# Patient Record
Sex: MALE | Race: WHITE | Employment: FULL TIME | URBAN - METROPOLITAN AREA
[De-identification: names, ages, dates, MRNs, and addresses within clinical notes are randomized per-mention and may not be internally consistent; named-entity substitution may affect disease eponyms.]

---

## 2017-04-26 ENCOUNTER — INITIAL CONSULT (OUTPATIENT)
Dept: SURGERY | Age: 37
End: 2017-04-26

## 2017-04-26 VITALS
DIASTOLIC BLOOD PRESSURE: 80 MMHG | HEIGHT: 70 IN | BODY MASS INDEX: 24.2 KG/M2 | SYSTOLIC BLOOD PRESSURE: 116 MMHG | WEIGHT: 169 LBS | HEART RATE: 76 BPM

## 2017-04-26 DIAGNOSIS — K42.9 UMBILICAL HERNIA WITHOUT OBSTRUCTION AND WITHOUT GANGRENE: ICD-10-CM

## 2017-04-26 DIAGNOSIS — K40.20 BILATERAL INGUINAL HERNIA WITHOUT OBSTRUCTION OR GANGRENE, RECURRENCE NOT SPECIFIED: Primary | ICD-10-CM

## 2017-04-26 PROCEDURE — 99203 OFFICE O/P NEW LOW 30 MIN: CPT | Performed by: SURGERY

## 2017-04-26 ASSESSMENT — ENCOUNTER SYMPTOMS
NAUSEA: 1
VOMITING: 0
DIARRHEA: 0
CONSTIPATION: 0
RESPIRATORY NEGATIVE: 1
ABDOMINAL PAIN: 1

## 2017-04-28 ENCOUNTER — TELEPHONE (OUTPATIENT)
Dept: SURGERY | Age: 37
End: 2017-04-28

## 2017-05-05 ENCOUNTER — PAT TELEPHONE (OUTPATIENT)
Dept: PREADMISSION TESTING | Age: 37
End: 2017-05-05

## 2017-05-05 VITALS — HEIGHT: 70 IN | WEIGHT: 169 LBS | BODY MASS INDEX: 24.2 KG/M2

## 2017-05-05 ASSESSMENT — PAIN DESCRIPTION - DESCRIPTORS: DESCRIPTORS: ACHING

## 2017-05-05 ASSESSMENT — PAIN DESCRIPTION - ORIENTATION: ORIENTATION: LEFT

## 2017-05-05 ASSESSMENT — PAIN - FUNCTIONAL ASSESSMENT: PAIN_FUNCTIONAL_ASSESSMENT: 0-10

## 2017-05-05 ASSESSMENT — PAIN SCALES - GENERAL: PAINLEVEL_OUTOF10: 6

## 2017-05-05 ASSESSMENT — PAIN DESCRIPTION - PAIN TYPE: TYPE: ACUTE PAIN

## 2017-05-05 ASSESSMENT — PAIN DESCRIPTION - LOCATION: LOCATION: GROIN

## 2017-05-08 ENCOUNTER — HOSPITAL ENCOUNTER (OUTPATIENT)
Dept: SURGERY | Age: 37
Discharge: HOME | End: 2017-05-08
Attending: SURGERY | Admitting: SURGERY

## 2017-05-08 VITALS
HEART RATE: 63 BPM | TEMPERATURE: 97.2 F | OXYGEN SATURATION: 99 % | SYSTOLIC BLOOD PRESSURE: 130 MMHG | WEIGHT: 167.55 LBS | BODY MASS INDEX: 24.04 KG/M2 | RESPIRATION RATE: 18 BRPM | DIASTOLIC BLOOD PRESSURE: 87 MMHG

## 2017-05-08 PROCEDURE — 49585 REPAIR UMBILICAL HERN,5+Y/O,REDUC: CPT | Performed by: SURGERY

## 2017-05-08 PROCEDURE — 49650 LAP ING HERNIA REPAIR INIT: CPT | Performed by: SURGERY

## 2017-05-08 RX ORDER — SODIUM CHLORIDE 0.9 % (FLUSH) 0.9 %
10 SYRINGE (ML) INJECTION PRN
Status: DISCONTINUED | OUTPATIENT
Start: 2017-05-08 | End: 2017-05-09 | Stop reason: HOSPADM

## 2017-05-08 RX ORDER — LABETALOL HYDROCHLORIDE 5 MG/ML
5 INJECTION, SOLUTION INTRAVENOUS EVERY 10 MIN PRN
Status: DISCONTINUED | OUTPATIENT
Start: 2017-05-08 | End: 2017-05-09 | Stop reason: HOSPADM

## 2017-05-08 RX ORDER — OXYCODONE HYDROCHLORIDE AND ACETAMINOPHEN 5; 325 MG/1; MG/1
1-2 TABLET ORAL EVERY 6 HOURS PRN
Qty: 20 TABLET | Refills: 0 | Status: SHIPPED | OUTPATIENT
Start: 2017-05-08 | End: 2017-06-30 | Stop reason: ALTCHOICE

## 2017-05-08 RX ORDER — SODIUM CHLORIDE 0.9 % (FLUSH) 0.9 %
10 SYRINGE (ML) INJECTION EVERY 12 HOURS SCHEDULED
Status: DISCONTINUED | OUTPATIENT
Start: 2017-05-08 | End: 2017-05-09 | Stop reason: HOSPADM

## 2017-05-08 RX ORDER — PROMETHAZINE HYDROCHLORIDE 25 MG/ML
6.25 INJECTION, SOLUTION INTRAMUSCULAR; INTRAVENOUS
Status: ACTIVE | OUTPATIENT
Start: 2017-05-08 | End: 2017-05-08

## 2017-05-08 RX ORDER — SODIUM CHLORIDE 9 MG/ML
INJECTION, SOLUTION INTRAVENOUS CONTINUOUS
Status: DISCONTINUED | OUTPATIENT
Start: 2017-05-08 | End: 2017-05-09 | Stop reason: HOSPADM

## 2017-05-08 RX ORDER — NAPROXEN 500 MG/1
500 TABLET ORAL 2 TIMES DAILY WITH MEALS
Qty: 20 TABLET | Refills: 0 | Status: SHIPPED | OUTPATIENT
Start: 2017-05-08 | End: 2017-06-30 | Stop reason: ALTCHOICE

## 2017-05-08 RX ORDER — FENTANYL CITRATE 50 UG/ML
25 INJECTION, SOLUTION INTRAMUSCULAR; INTRAVENOUS EVERY 5 MIN PRN
Status: COMPLETED | OUTPATIENT
Start: 2017-05-08 | End: 2017-05-08

## 2017-05-08 RX ORDER — OXYCODONE HYDROCHLORIDE AND ACETAMINOPHEN 5; 325 MG/1; MG/1
2 TABLET ORAL EVERY 4 HOURS PRN
Status: COMPLETED | OUTPATIENT
Start: 2017-05-08 | End: 2017-05-08

## 2017-05-08 RX ADMIN — OXYCODONE HYDROCHLORIDE AND ACETAMINOPHEN 2 TABLET: 5; 325 TABLET ORAL at 10:36

## 2017-05-08 RX ADMIN — FENTANYL CITRATE 25 MCG: 50 INJECTION, SOLUTION INTRAMUSCULAR; INTRAVENOUS at 09:43

## 2017-05-08 RX ADMIN — FENTANYL CITRATE 25 MCG: 50 INJECTION, SOLUTION INTRAMUSCULAR; INTRAVENOUS at 09:33

## 2017-05-08 RX ADMIN — SODIUM CHLORIDE: 9 INJECTION, SOLUTION INTRAVENOUS at 06:41

## 2017-05-08 RX ADMIN — FENTANYL CITRATE 25 MCG: 50 INJECTION, SOLUTION INTRAMUSCULAR; INTRAVENOUS at 09:48

## 2017-05-08 RX ADMIN — FENTANYL CITRATE 25 MCG: 50 INJECTION, SOLUTION INTRAMUSCULAR; INTRAVENOUS at 09:38

## 2017-05-08 ASSESSMENT — PAIN DESCRIPTION - LOCATION
LOCATION: ABDOMEN
LOCATION: ABDOMEN

## 2017-05-08 ASSESSMENT — PAIN SCALES - GENERAL
PAINLEVEL_OUTOF10: 10
PAINLEVEL_OUTOF10: 10
PAINLEVEL_OUTOF10: 8
PAINLEVEL_OUTOF10: 9
PAINLEVEL_OUTOF10: 7
PAINLEVEL_OUTOF10: 10
PAINLEVEL_OUTOF10: 7
PAINLEVEL_OUTOF10: 8

## 2017-05-08 ASSESSMENT — PAIN DESCRIPTION - PROGRESSION
CLINICAL_PROGRESSION: GRADUALLY IMPROVING
CLINICAL_PROGRESSION: GRADUALLY WORSENING

## 2017-05-08 ASSESSMENT — PAIN DESCRIPTION - ONSET
ONSET: ON-GOING
ONSET: ON-GOING

## 2017-05-08 ASSESSMENT — PAIN DESCRIPTION - PAIN TYPE
TYPE: SURGICAL PAIN
TYPE: ACUTE PAIN

## 2017-05-08 ASSESSMENT — PAIN DESCRIPTION - DESCRIPTORS
DESCRIPTORS: ACHING;DISCOMFORT;SORE;SHARP
DESCRIPTORS: ACHING;DISCOMFORT;SHARP;SORE

## 2017-05-08 ASSESSMENT — PAIN DESCRIPTION - FREQUENCY
FREQUENCY: CONTINUOUS
FREQUENCY: CONTINUOUS

## 2017-05-08 ASSESSMENT — PAIN - FUNCTIONAL ASSESSMENT: PAIN_FUNCTIONAL_ASSESSMENT: 0-10

## 2017-05-09 ENCOUNTER — TELEPHONE (OUTPATIENT)
Dept: SURGERY | Age: 37
End: 2017-05-09

## 2017-05-11 ENCOUNTER — TELEPHONE (OUTPATIENT)
Dept: SURGERY | Age: 37
End: 2017-05-11

## 2017-05-17 ENCOUNTER — OFFICE VISIT (OUTPATIENT)
Dept: SURGERY | Age: 37
End: 2017-05-17

## 2017-05-17 VITALS
BODY MASS INDEX: 23.34 KG/M2 | HEART RATE: 77 BPM | DIASTOLIC BLOOD PRESSURE: 85 MMHG | SYSTOLIC BLOOD PRESSURE: 129 MMHG | WEIGHT: 163 LBS | HEIGHT: 70 IN

## 2017-05-17 DIAGNOSIS — K40.20 BILATERAL INGUINAL HERNIA WITHOUT OBSTRUCTION OR GANGRENE, RECURRENCE NOT SPECIFIED: Primary | ICD-10-CM

## 2017-05-17 DIAGNOSIS — K42.9 UMBILICAL HERNIA WITHOUT OBSTRUCTION AND WITHOUT GANGRENE: ICD-10-CM

## 2017-05-17 PROCEDURE — 99024 POSTOP FOLLOW-UP VISIT: CPT | Performed by: SURGERY

## 2017-05-17 RX ORDER — OXYCODONE HYDROCHLORIDE AND ACETAMINOPHEN 5; 325 MG/1; MG/1
1 TABLET ORAL EVERY 6 HOURS PRN
Qty: 15 TABLET | Refills: 0 | Status: SHIPPED | OUTPATIENT
Start: 2017-05-17 | End: 2017-06-30 | Stop reason: ALTCHOICE

## 2017-05-18 ENCOUNTER — TELEPHONE (OUTPATIENT)
Dept: CASE MANAGEMENT | Age: 37
End: 2017-05-18

## 2017-05-31 ENCOUNTER — OFFICE VISIT (OUTPATIENT)
Dept: SURGERY | Age: 37
End: 2017-05-31

## 2017-05-31 VITALS
WEIGHT: 163 LBS | SYSTOLIC BLOOD PRESSURE: 121 MMHG | HEART RATE: 84 BPM | BODY MASS INDEX: 23.34 KG/M2 | HEIGHT: 70 IN | DIASTOLIC BLOOD PRESSURE: 83 MMHG

## 2017-05-31 DIAGNOSIS — K40.20 BILATERAL INGUINAL HERNIA WITHOUT OBSTRUCTION OR GANGRENE, RECURRENCE NOT SPECIFIED: Primary | ICD-10-CM

## 2017-05-31 DIAGNOSIS — K42.9 UMBILICAL HERNIA WITHOUT OBSTRUCTION AND WITHOUT GANGRENE: ICD-10-CM

## 2017-05-31 PROCEDURE — 99024 POSTOP FOLLOW-UP VISIT: CPT | Performed by: SURGERY

## 2017-06-07 ENCOUNTER — OFFICE VISIT (OUTPATIENT)
Dept: SURGERY | Age: 37
End: 2017-06-07

## 2017-06-07 VITALS
BODY MASS INDEX: 24.05 KG/M2 | HEART RATE: 66 BPM | SYSTOLIC BLOOD PRESSURE: 113 MMHG | WEIGHT: 168 LBS | HEIGHT: 70 IN | DIASTOLIC BLOOD PRESSURE: 77 MMHG

## 2017-06-07 DIAGNOSIS — K42.9 UMBILICAL HERNIA WITHOUT OBSTRUCTION AND WITHOUT GANGRENE: ICD-10-CM

## 2017-06-07 DIAGNOSIS — K40.20 BILATERAL INGUINAL HERNIA WITHOUT OBSTRUCTION OR GANGRENE, RECURRENCE NOT SPECIFIED: Primary | ICD-10-CM

## 2017-06-07 PROCEDURE — 99024 POSTOP FOLLOW-UP VISIT: CPT | Performed by: SURGERY

## 2017-06-07 RX ORDER — SULFAMETHOXAZOLE AND TRIMETHOPRIM 800; 160 MG/1; MG/1
1 TABLET ORAL 2 TIMES DAILY
Qty: 10 TABLET | Refills: 0 | Status: SHIPPED | OUTPATIENT
Start: 2017-06-07 | End: 2017-06-12

## 2017-06-14 ENCOUNTER — TELEPHONE (OUTPATIENT)
Dept: SURGERY | Age: 37
End: 2017-06-14

## 2017-07-12 ENCOUNTER — OFFICE VISIT (OUTPATIENT)
Dept: PRIMARY CARE CLINIC | Age: 37
End: 2017-07-12

## 2017-07-12 VITALS
HEIGHT: 70 IN | BODY MASS INDEX: 24.54 KG/M2 | WEIGHT: 171.4 LBS | TEMPERATURE: 97.9 F | HEART RATE: 90 BPM | DIASTOLIC BLOOD PRESSURE: 78 MMHG | RESPIRATION RATE: 16 BRPM | SYSTOLIC BLOOD PRESSURE: 128 MMHG | OXYGEN SATURATION: 98 %

## 2017-07-12 DIAGNOSIS — G43.909 MIGRAINE WITHOUT STATUS MIGRAINOSUS, NOT INTRACTABLE, UNSPECIFIED MIGRAINE TYPE: Primary | ICD-10-CM

## 2017-07-12 PROCEDURE — 99202 OFFICE O/P NEW SF 15 MIN: CPT | Performed by: NURSE PRACTITIONER

## 2017-07-12 RX ORDER — SUMATRIPTAN 20 MG/1
1 SPRAY NASAL DAILY PRN
Qty: 1 INHALER | Refills: 5 | Status: SHIPPED | OUTPATIENT
Start: 2017-07-12 | End: 2017-07-21

## 2017-07-12 RX ORDER — METHYLPREDNISOLONE 4 MG/1
TABLET ORAL
Qty: 1 KIT | Refills: 0 | Status: SHIPPED | OUTPATIENT
Start: 2017-07-12 | End: 2017-07-19 | Stop reason: SDUPTHER

## 2017-07-12 RX ORDER — SUMATRIPTAN 100 MG/1
100 TABLET, FILM COATED ORAL
Qty: 10 TABLET | Refills: 0 | Status: SHIPPED | OUTPATIENT
Start: 2017-07-12 | End: 2017-07-22

## 2017-07-12 ASSESSMENT — ENCOUNTER SYMPTOMS
FACIAL SWEATING: 0
EYE REDNESS: 0
VOMITING: 1
SCALP TENDERNESS: 0
RHINORRHEA: 1
BACK PAIN: 0
EYE PAIN: 0
EYE WATERING: 1
NAUSEA: 1
ABDOMINAL PAIN: 0
SORE THROAT: 0
VISUAL CHANGE: 0
PHOTOPHOBIA: 1
BLURRED VISION: 0

## 2017-07-13 ASSESSMENT — ENCOUNTER SYMPTOMS
CONSTIPATION: 0
COUGH: 0
DIARRHEA: 0

## 2017-07-14 ENCOUNTER — TELEPHONE (OUTPATIENT)
Dept: PRIMARY CARE CLINIC | Age: 37
End: 2017-07-14

## 2017-07-14 DIAGNOSIS — G43.909 MIGRAINE WITHOUT STATUS MIGRAINOSUS, NOT INTRACTABLE, UNSPECIFIED MIGRAINE TYPE: Primary | ICD-10-CM

## 2017-07-18 ENCOUNTER — TELEPHONE (OUTPATIENT)
Dept: PRIMARY CARE CLINIC | Age: 37
End: 2017-07-18

## 2017-07-18 RX ORDER — RIZATRIPTAN BENZOATE 10 MG/1
10 TABLET ORAL
Qty: 30 TABLET | Refills: 0 | Status: SHIPPED | OUTPATIENT
Start: 2017-07-18 | End: 2017-08-01 | Stop reason: SDUPTHER

## 2017-07-18 RX ORDER — ELETRIPTAN HYDROBROMIDE 40 MG/1
40 TABLET, FILM COATED ORAL
Qty: 6 TABLET | Refills: 3 | Status: SHIPPED | OUTPATIENT
Start: 2017-07-18 | End: 2017-07-18 | Stop reason: ALTCHOICE

## 2017-07-19 ENCOUNTER — TELEPHONE (OUTPATIENT)
Dept: PRIMARY CARE CLINIC | Age: 37
End: 2017-07-19

## 2017-07-19 DIAGNOSIS — G43.909 MIGRAINE WITHOUT STATUS MIGRAINOSUS, NOT INTRACTABLE, UNSPECIFIED MIGRAINE TYPE: ICD-10-CM

## 2017-07-19 RX ORDER — METHYLPREDNISOLONE 4 MG/1
TABLET ORAL
Qty: 1 KIT | Refills: 0 | Status: SHIPPED | OUTPATIENT
Start: 2017-07-19 | End: 2017-07-25

## 2017-08-01 ENCOUNTER — TELEPHONE (OUTPATIENT)
Dept: PRIMARY CARE CLINIC | Age: 37
End: 2017-08-01

## 2017-08-01 RX ORDER — PROPRANOLOL HYDROCHLORIDE 10 MG/1
10 TABLET ORAL 3 TIMES DAILY
Qty: 90 TABLET | Refills: 0 | Status: SHIPPED | OUTPATIENT
Start: 2017-08-01 | End: 2017-08-24 | Stop reason: CLARIF

## 2017-08-01 RX ORDER — PREDNISONE 20 MG/1
20 TABLET ORAL DAILY
Qty: 7 TABLET | Refills: 0 | Status: SHIPPED | OUTPATIENT
Start: 2017-08-01 | End: 2017-08-08

## 2017-08-01 RX ORDER — RIZATRIPTAN BENZOATE 10 MG/1
10 TABLET ORAL
Qty: 10 TABLET | Refills: 0 | Status: SHIPPED | OUTPATIENT
Start: 2017-08-01 | End: 2019-01-14 | Stop reason: ALTCHOICE

## 2017-08-01 RX ORDER — ELETRIPTAN HYDROBROMIDE 40 MG/1
40 TABLET, FILM COATED ORAL
Qty: 6 TABLET | Refills: 3 | Status: SHIPPED | OUTPATIENT
Start: 2017-08-01 | End: 2019-01-14 | Stop reason: ALTCHOICE

## 2017-08-24 ENCOUNTER — TELEPHONE (OUTPATIENT)
Dept: NEUROLOGY | Age: 37
End: 2017-08-24

## 2017-08-24 ENCOUNTER — OFFICE VISIT (OUTPATIENT)
Dept: NEUROLOGY | Age: 37
End: 2017-08-24

## 2017-08-24 VITALS
DIASTOLIC BLOOD PRESSURE: 83 MMHG | HEART RATE: 68 BPM | WEIGHT: 173 LBS | BODY MASS INDEX: 24.77 KG/M2 | SYSTOLIC BLOOD PRESSURE: 120 MMHG | HEIGHT: 70 IN

## 2017-08-24 DIAGNOSIS — G43.019 INTRACTABLE MIGRAINE WITHOUT AURA AND WITHOUT STATUS MIGRAINOSUS: Primary | ICD-10-CM

## 2017-08-24 PROCEDURE — 99244 OFF/OP CNSLTJ NEW/EST MOD 40: CPT | Performed by: PSYCHIATRY & NEUROLOGY

## 2017-08-24 RX ORDER — DIVALPROEX SODIUM 500 MG/1
TABLET, EXTENDED RELEASE ORAL
Qty: 30 TABLET | Refills: 1 | Status: SHIPPED | OUTPATIENT
Start: 2017-08-24 | End: 2019-01-14 | Stop reason: ALTCHOICE

## 2017-08-29 ENCOUNTER — TELEPHONE (OUTPATIENT)
Dept: NEUROLOGY | Age: 37
End: 2017-08-29

## 2017-12-20 ENCOUNTER — OFFICE VISIT (OUTPATIENT)
Dept: SURGERY | Age: 37
End: 2017-12-20

## 2017-12-20 VITALS
HEART RATE: 70 BPM | SYSTOLIC BLOOD PRESSURE: 137 MMHG | BODY MASS INDEX: 24.77 KG/M2 | DIASTOLIC BLOOD PRESSURE: 89 MMHG | WEIGHT: 173 LBS | HEIGHT: 70 IN

## 2017-12-20 DIAGNOSIS — R10.9 LEFT FLANK PAIN: Primary | ICD-10-CM

## 2017-12-20 PROBLEM — R10.12 LUQ PAIN: Status: ACTIVE | Noted: 2017-12-20

## 2017-12-20 PROCEDURE — 99213 OFFICE O/P EST LOW 20 MIN: CPT | Performed by: SURGERY

## 2017-12-20 ASSESSMENT — ENCOUNTER SYMPTOMS
GASTROINTESTINAL NEGATIVE: 1
RESPIRATORY NEGATIVE: 1

## 2017-12-20 NOTE — PROGRESS NOTES
Subjective:      Patient ID: Beck Wesley is a 40 y.o. male. HPI  CC: pain  Known from previous lap BIH and umbilical hernia repair. Reports one week hx left flank pain. Normal BMs. Denies nausea, emesis, fever, chills. Works out. Hurts worse with movements, twisting. Minimal relief with advil. Review of Systems   Constitutional: Negative. Respiratory: Negative. Cardiovascular: Negative. Gastrointestinal: Negative. Musculoskeletal: Negative. Skin: Negative. All other systems reviewed and are negative. Objective:   Physical Exam   Constitutional: He is oriented to person, place, and time. He appears well-developed and well-nourished. No distress. HENT:   Head: Normocephalic and atraumatic. Right Ear: External ear normal.   Left Ear: External ear normal.   Nose: Nose normal.   Mouth/Throat: Oropharynx is clear and moist.   Eyes: Conjunctivae are normal. No scleral icterus. Neck: Normal range of motion. Neck supple. Pulmonary/Chest: Effort normal. No respiratory distress. Abdominal: Soft. He exhibits no distension. There is tenderness (left mid flank). No recurrent bilateral inguinal or umbilical hernia appreciated     Musculoskeletal: Normal range of motion. He exhibits no edema. Neurological: He is alert and oriented to person, place, and time. Skin: Skin is warm and dry. He is not diaphoretic. No erythema. Psychiatric: He has a normal mood and affect. His behavior is normal. Judgment and thought content normal.   Vitals reviewed. Assessment:      1. Left flank pain             Plan:      No evidence of recurrent hernia  Suspect MSK strain  Encouraged BID stretching and scheduled NSAIDs  Call if no improvement 3 weeks.   Consider CT but felt to be low yield at this time

## 2018-11-10 ENCOUNTER — HOSPITAL ENCOUNTER (EMERGENCY)
Age: 38
Discharge: HOME | End: 2018-11-10
Attending: EMERGENCY MEDICINE
Payer: COMMERCIAL

## 2018-11-10 ENCOUNTER — APPOINTMENT (OUTPATIENT)
Dept: CT IMAGING | Age: 38
End: 2018-11-10
Payer: COMMERCIAL

## 2018-11-10 VITALS
OXYGEN SATURATION: 96 % | BODY MASS INDEX: 25.11 KG/M2 | WEIGHT: 175 LBS | HEART RATE: 76 BPM | RESPIRATION RATE: 16 BRPM | SYSTOLIC BLOOD PRESSURE: 125 MMHG | TEMPERATURE: 98.2 F | DIASTOLIC BLOOD PRESSURE: 93 MMHG

## 2018-11-10 DIAGNOSIS — L03.211 FACIAL CELLULITIS: Primary | ICD-10-CM

## 2018-11-10 LAB
ANION GAP SERPL CALCULATED.3IONS-SCNC: 10 MMOL/L (ref 3–16)
BASOPHILS ABSOLUTE: 0.1 K/UL (ref 0–0.2)
BASOPHILS RELATIVE PERCENT: 0.7 %
BUN BLDV-MCNC: 28 MG/DL (ref 7–20)
CALCIUM SERPL-MCNC: 9.6 MG/DL (ref 8.3–10.6)
CHLORIDE BLD-SCNC: 103 MMOL/L (ref 99–110)
CO2: 24 MMOL/L (ref 21–32)
CREAT SERPL-MCNC: 0.7 MG/DL (ref 0.9–1.3)
EOSINOPHILS ABSOLUTE: 0.1 K/UL (ref 0–0.6)
EOSINOPHILS RELATIVE PERCENT: 1.1 %
GFR AFRICAN AMERICAN: >60
GFR NON-AFRICAN AMERICAN: >60
GLUCOSE BLD-MCNC: 92 MG/DL (ref 70–99)
HCT VFR BLD CALC: 43.9 % (ref 40.5–52.5)
HEMOGLOBIN: 14.9 G/DL (ref 13.5–17.5)
LYMPHOCYTES ABSOLUTE: 1.7 K/UL (ref 1–5.1)
LYMPHOCYTES RELATIVE PERCENT: 21.3 %
MCH RBC QN AUTO: 31 PG (ref 26–34)
MCHC RBC AUTO-ENTMCNC: 33.9 G/DL (ref 31–36)
MCV RBC AUTO: 91.6 FL (ref 80–100)
MONOCYTES ABSOLUTE: 0.7 K/UL (ref 0–1.3)
MONOCYTES RELATIVE PERCENT: 8.9 %
NEUTROPHILS ABSOLUTE: 5.4 K/UL (ref 1.7–7.7)
NEUTROPHILS RELATIVE PERCENT: 68 %
PDW BLD-RTO: 12.5 % (ref 12.4–15.4)
PLATELET # BLD: 162 K/UL (ref 135–450)
PMV BLD AUTO: 8.3 FL (ref 5–10.5)
POTASSIUM SERPL-SCNC: 3.8 MMOL/L (ref 3.5–5.1)
RBC # BLD: 4.8 M/UL (ref 4.2–5.9)
SODIUM BLD-SCNC: 137 MMOL/L (ref 136–145)
WBC # BLD: 7.9 K/UL (ref 4–11)

## 2018-11-10 PROCEDURE — 99284 EMERGENCY DEPT VISIT MOD MDM: CPT

## 2018-11-10 PROCEDURE — 36415 COLL VENOUS BLD VENIPUNCTURE: CPT

## 2018-11-10 PROCEDURE — 70487 CT MAXILLOFACIAL W/DYE: CPT

## 2018-11-10 PROCEDURE — 6370000000 HC RX 637 (ALT 250 FOR IP): Performed by: EMERGENCY MEDICINE

## 2018-11-10 PROCEDURE — 80048 BASIC METABOLIC PNL TOTAL CA: CPT

## 2018-11-10 PROCEDURE — 85025 COMPLETE CBC W/AUTO DIFF WBC: CPT

## 2018-11-10 PROCEDURE — 6360000004 HC RX CONTRAST MEDICATION: Performed by: EMERGENCY MEDICINE

## 2018-11-10 RX ORDER — ACETAMINOPHEN 325 MG/1
650 TABLET ORAL ONCE
Status: COMPLETED | OUTPATIENT
Start: 2018-11-10 | End: 2018-11-10

## 2018-11-10 RX ORDER — TRAMADOL HYDROCHLORIDE 50 MG/1
50 TABLET ORAL ONCE
Status: COMPLETED | OUTPATIENT
Start: 2018-11-10 | End: 2018-11-10

## 2018-11-10 RX ORDER — TRAMADOL HYDROCHLORIDE 50 MG/1
50 TABLET ORAL EVERY 8 HOURS PRN
Qty: 12 TABLET | Refills: 0 | Status: SHIPPED | OUTPATIENT
Start: 2018-11-10 | End: 2019-04-08 | Stop reason: SDUPTHER

## 2018-11-10 RX ADMIN — TRAMADOL HYDROCHLORIDE 50 MG: 50 TABLET, FILM COATED ORAL at 04:03

## 2018-11-10 RX ADMIN — ACETAMINOPHEN 650 MG: 325 TABLET ORAL at 02:43

## 2018-11-10 RX ADMIN — IOPAMIDOL 75 ML: 755 INJECTION, SOLUTION INTRAVENOUS at 03:09

## 2018-11-10 ASSESSMENT — ENCOUNTER SYMPTOMS
TROUBLE SWALLOWING: 0
NAUSEA: 0
VOMITING: 0

## 2018-11-10 ASSESSMENT — PAIN SCALES - GENERAL
PAINLEVEL_OUTOF10: 10
PAINLEVEL_OUTOF10: 8
PAINLEVEL_OUTOF10: 10
PAINLEVEL_OUTOF10: 8

## 2019-01-14 ENCOUNTER — OFFICE VISIT (OUTPATIENT)
Dept: FAMILY MEDICINE CLINIC | Age: 39
End: 2019-01-14
Payer: COMMERCIAL

## 2019-01-14 VITALS
HEIGHT: 70 IN | TEMPERATURE: 97.9 F | SYSTOLIC BLOOD PRESSURE: 116 MMHG | WEIGHT: 176 LBS | DIASTOLIC BLOOD PRESSURE: 78 MMHG | HEART RATE: 68 BPM | BODY MASS INDEX: 25.2 KG/M2 | OXYGEN SATURATION: 98 %

## 2019-01-14 DIAGNOSIS — Z80.0 FH: COLON CANCER IN FIRST DEGREE RELATIVE <60 YEARS OLD: ICD-10-CM

## 2019-01-14 DIAGNOSIS — M62.521 ATROPHY OF MUSCLE OF RIGHT UPPER ARM: ICD-10-CM

## 2019-01-14 DIAGNOSIS — Z13.220 NEED FOR LIPID SCREENING: ICD-10-CM

## 2019-01-14 DIAGNOSIS — R29.898 RIGHT ARM WEAKNESS: ICD-10-CM

## 2019-01-14 DIAGNOSIS — Z12.11 SCREENING FOR COLON CANCER: ICD-10-CM

## 2019-01-14 DIAGNOSIS — M72.2 PLANTAR FASCIITIS OF RIGHT FOOT: Primary | ICD-10-CM

## 2019-01-14 DIAGNOSIS — Z13.1 SCREENING FOR DIABETES MELLITUS: ICD-10-CM

## 2019-01-14 PROCEDURE — G8427 DOCREV CUR MEDS BY ELIG CLIN: HCPCS | Performed by: NURSE PRACTITIONER

## 2019-01-14 PROCEDURE — 1036F TOBACCO NON-USER: CPT | Performed by: NURSE PRACTITIONER

## 2019-01-14 PROCEDURE — G8419 CALC BMI OUT NRM PARAM NOF/U: HCPCS | Performed by: NURSE PRACTITIONER

## 2019-01-14 PROCEDURE — 99204 OFFICE O/P NEW MOD 45 MIN: CPT | Performed by: NURSE PRACTITIONER

## 2019-01-14 PROCEDURE — G8484 FLU IMMUNIZE NO ADMIN: HCPCS | Performed by: NURSE PRACTITIONER

## 2019-01-14 RX ORDER — ACETAMINOPHEN AND CODEINE PHOSPHATE 300; 30 MG/1; MG/1
1 TABLET ORAL EVERY 6 HOURS PRN
Qty: 28 TABLET | Refills: 0 | Status: SHIPPED | OUTPATIENT
Start: 2019-01-14 | End: 2019-02-01 | Stop reason: SDUPTHER

## 2019-01-14 RX ORDER — PREDNISONE 10 MG/1
TABLET ORAL
Qty: 30 TABLET | Refills: 0 | Status: SHIPPED | OUTPATIENT
Start: 2019-01-14 | End: 2019-04-18 | Stop reason: ALTCHOICE

## 2019-01-14 ASSESSMENT — PATIENT HEALTH QUESTIONNAIRE - PHQ9
2. FEELING DOWN, DEPRESSED OR HOPELESS: 0
1. LITTLE INTEREST OR PLEASURE IN DOING THINGS: 0
SUM OF ALL RESPONSES TO PHQ9 QUESTIONS 1 & 2: 0
SUM OF ALL RESPONSES TO PHQ QUESTIONS 1-9: 0
SUM OF ALL RESPONSES TO PHQ QUESTIONS 1-9: 0

## 2019-01-28 ENCOUNTER — OFFICE VISIT (OUTPATIENT)
Dept: ORTHOPEDIC SURGERY | Age: 39
End: 2019-01-28
Payer: COMMERCIAL

## 2019-01-28 ENCOUNTER — TELEPHONE (OUTPATIENT)
Dept: ORTHOPEDIC SURGERY | Age: 39
End: 2019-01-28

## 2019-01-28 VITALS — WEIGHT: 176 LBS | RESPIRATION RATE: 16 BRPM | BODY MASS INDEX: 25.2 KG/M2 | HEIGHT: 70 IN

## 2019-01-28 DIAGNOSIS — G72.89: Primary | ICD-10-CM

## 2019-01-28 DIAGNOSIS — M48.02 CERVICAL SPINAL STENOSIS: ICD-10-CM

## 2019-01-28 DIAGNOSIS — M25.511 RIGHT SHOULDER PAIN, UNSPECIFIED CHRONICITY: ICD-10-CM

## 2019-01-28 PROCEDURE — G8484 FLU IMMUNIZE NO ADMIN: HCPCS | Performed by: ORTHOPAEDIC SURGERY

## 2019-01-28 PROCEDURE — G8427 DOCREV CUR MEDS BY ELIG CLIN: HCPCS | Performed by: ORTHOPAEDIC SURGERY

## 2019-01-28 PROCEDURE — G8419 CALC BMI OUT NRM PARAM NOF/U: HCPCS | Performed by: ORTHOPAEDIC SURGERY

## 2019-01-28 PROCEDURE — 99243 OFF/OP CNSLTJ NEW/EST LOW 30: CPT | Performed by: ORTHOPAEDIC SURGERY

## 2019-02-01 ENCOUNTER — TELEPHONE (OUTPATIENT)
Dept: FAMILY MEDICINE CLINIC | Age: 39
End: 2019-02-01

## 2019-02-01 DIAGNOSIS — M72.2 PLANTAR FASCIITIS OF RIGHT FOOT: ICD-10-CM

## 2019-02-01 RX ORDER — ACETAMINOPHEN AND CODEINE PHOSPHATE 300; 30 MG/1; MG/1
1 TABLET ORAL EVERY 6 HOURS PRN
Qty: 28 TABLET | Refills: 0 | Status: SHIPPED | OUTPATIENT
Start: 2019-02-01 | End: 2019-02-08

## 2019-02-15 ENCOUNTER — TELEPHONE (OUTPATIENT)
Dept: ORTHOPEDIC SURGERY | Age: 39
End: 2019-02-15

## 2019-02-19 ENCOUNTER — HOSPITAL ENCOUNTER (OUTPATIENT)
Dept: NEUROLOGY | Age: 39
Discharge: HOME | End: 2019-02-19
Payer: COMMERCIAL

## 2019-02-19 DIAGNOSIS — G72.89: ICD-10-CM

## 2019-02-19 PROCEDURE — 95908 NRV CNDJ TST 3-4 STUDIES: CPT

## 2019-02-19 PROCEDURE — 95860 NEEDLE EMG 1 EXTREMITY: CPT

## 2019-02-21 PROBLEM — M54.12 CERVICAL RADICULOPATHY: Status: ACTIVE | Noted: 2019-02-21

## 2019-03-27 ENCOUNTER — OFFICE VISIT (OUTPATIENT)
Dept: ORTHOPEDIC SURGERY | Age: 39
End: 2019-03-27
Payer: COMMERCIAL

## 2019-03-27 VITALS
HEIGHT: 70 IN | BODY MASS INDEX: 25.19 KG/M2 | HEART RATE: 70 BPM | WEIGHT: 175.93 LBS | SYSTOLIC BLOOD PRESSURE: 109 MMHG | DIASTOLIC BLOOD PRESSURE: 69 MMHG

## 2019-03-27 DIAGNOSIS — M62.519 ATROPHY OF DELTOID MUSCLE: ICD-10-CM

## 2019-03-27 DIAGNOSIS — M25.511 RIGHT SHOULDER PAIN, UNSPECIFIED CHRONICITY: Primary | ICD-10-CM

## 2019-03-27 DIAGNOSIS — M54.12 CERVICAL RADICULOPATHY: ICD-10-CM

## 2019-03-27 PROCEDURE — G8484 FLU IMMUNIZE NO ADMIN: HCPCS | Performed by: ORTHOPAEDIC SURGERY

## 2019-03-27 PROCEDURE — G8419 CALC BMI OUT NRM PARAM NOF/U: HCPCS | Performed by: ORTHOPAEDIC SURGERY

## 2019-03-27 PROCEDURE — 99243 OFF/OP CNSLTJ NEW/EST LOW 30: CPT | Performed by: ORTHOPAEDIC SURGERY

## 2019-03-27 PROCEDURE — G8427 DOCREV CUR MEDS BY ELIG CLIN: HCPCS | Performed by: ORTHOPAEDIC SURGERY

## 2019-03-27 ASSESSMENT — ENCOUNTER SYMPTOMS
GASTROINTESTINAL NEGATIVE: 1
ALLERGIC/IMMUNOLOGIC NEGATIVE: 1
RESPIRATORY NEGATIVE: 1
EYES NEGATIVE: 1

## 2019-04-08 ENCOUNTER — HOSPITAL ENCOUNTER (OUTPATIENT)
Dept: PHYSICAL THERAPY | Age: 39
Setting detail: THERAPIES SERIES
Discharge: HOME | End: 2019-04-08
Payer: COMMERCIAL

## 2019-04-08 DIAGNOSIS — M72.2 PLANTAR FASCIITIS: Primary | ICD-10-CM

## 2019-04-08 DIAGNOSIS — L03.211 FACIAL CELLULITIS: ICD-10-CM

## 2019-04-08 RX ORDER — TRAMADOL HYDROCHLORIDE 50 MG/1
50 TABLET ORAL EVERY 8 HOURS PRN
Qty: 12 TABLET | Refills: 0 | Status: SHIPPED | OUTPATIENT
Start: 2019-04-08 | End: 2019-04-12

## 2019-04-17 ENCOUNTER — TELEPHONE (OUTPATIENT)
Dept: FAMILY MEDICINE CLINIC | Age: 39
End: 2019-04-17

## 2019-04-18 ENCOUNTER — OFFICE VISIT (OUTPATIENT)
Dept: FAMILY MEDICINE CLINIC | Age: 39
End: 2019-04-18
Payer: COMMERCIAL

## 2019-04-18 VITALS
HEIGHT: 72 IN | WEIGHT: 172 LBS | SYSTOLIC BLOOD PRESSURE: 104 MMHG | DIASTOLIC BLOOD PRESSURE: 68 MMHG | BODY MASS INDEX: 23.3 KG/M2

## 2019-04-18 DIAGNOSIS — M72.2 PLANTAR FASCIITIS, RIGHT: Primary | ICD-10-CM

## 2019-04-18 PROCEDURE — 20550 NJX 1 TENDON SHEATH/LIGAMENT: CPT | Performed by: FAMILY MEDICINE

## 2019-04-18 RX ORDER — TRAMADOL HYDROCHLORIDE 50 MG/1
50 TABLET ORAL EVERY 4 HOURS PRN
Qty: 30 TABLET | Refills: 0 | Status: SHIPPED | OUTPATIENT
Start: 2019-04-18 | End: 2019-06-26 | Stop reason: SDUPTHER

## 2019-04-18 RX ORDER — METHYLPREDNISOLONE ACETATE 40 MG/ML
40 INJECTION, SUSPENSION INTRA-ARTICULAR; INTRALESIONAL; INTRAMUSCULAR; SOFT TISSUE ONCE
Status: COMPLETED | OUTPATIENT
Start: 2019-04-18 | End: 2019-04-18

## 2019-04-18 RX ADMIN — METHYLPREDNISOLONE ACETATE 40 MG: 40 INJECTION, SUSPENSION INTRA-ARTICULAR; INTRALESIONAL; INTRAMUSCULAR; SOFT TISSUE at 18:29

## 2019-05-09 ENCOUNTER — TELEPHONE (OUTPATIENT)
Dept: ORTHOPEDIC SURGERY | Age: 39
End: 2019-05-09

## 2019-06-26 DIAGNOSIS — M72.2 PLANTAR FASCIITIS, RIGHT: ICD-10-CM

## 2019-06-26 RX ORDER — TRAMADOL HYDROCHLORIDE 50 MG/1
50 TABLET ORAL EVERY 4 HOURS PRN
Qty: 30 TABLET | Refills: 0 | Status: SHIPPED | OUTPATIENT
Start: 2019-06-26 | End: 2019-07-12 | Stop reason: SDUPTHER

## 2019-06-26 NOTE — TELEPHONE ENCOUNTER
Patient is having trouble with the plantar fascitis again. Has an appointment with Dr Katerin Gonzalez on 7/12/19 for cortisone injection again. Asking if we can call in the Tramadol since the appointment is out a ways.   Uses [facility]

## 2019-07-12 ENCOUNTER — OFFICE VISIT (OUTPATIENT)
Dept: FAMILY MEDICINE CLINIC | Age: 39
End: 2019-07-12
Payer: COMMERCIAL

## 2019-07-12 VITALS
BODY MASS INDEX: 23.3 KG/M2 | SYSTOLIC BLOOD PRESSURE: 116 MMHG | WEIGHT: 172 LBS | DIASTOLIC BLOOD PRESSURE: 74 MMHG | HEIGHT: 72 IN

## 2019-07-12 DIAGNOSIS — M72.2 PLANTAR FASCIITIS, RIGHT: ICD-10-CM

## 2019-07-12 DIAGNOSIS — M72.2 PLANTAR FASCIITIS OF RIGHT FOOT: Primary | ICD-10-CM

## 2019-07-12 PROCEDURE — 20550 NJX 1 TENDON SHEATH/LIGAMENT: CPT | Performed by: FAMILY MEDICINE

## 2019-07-12 RX ORDER — METHYLPREDNISOLONE ACETATE 40 MG/ML
40 INJECTION, SUSPENSION INTRA-ARTICULAR; INTRALESIONAL; INTRAMUSCULAR; SOFT TISSUE ONCE
Status: COMPLETED | OUTPATIENT
Start: 2019-07-12 | End: 2019-07-12

## 2019-07-12 RX ORDER — TRAMADOL HYDROCHLORIDE 50 MG/1
50 TABLET ORAL EVERY 4 HOURS PRN
Qty: 30 TABLET | Refills: 0 | Status: SHIPPED | OUTPATIENT
Start: 2019-07-12 | End: 2019-10-25 | Stop reason: SDUPTHER

## 2019-07-12 RX ADMIN — METHYLPREDNISOLONE ACETATE 40 MG: 40 INJECTION, SUSPENSION INTRA-ARTICULAR; INTRALESIONAL; INTRAMUSCULAR; SOFT TISSUE at 16:12

## 2019-07-16 ENCOUNTER — TELEPHONE (OUTPATIENT)
Dept: FAMILY MEDICINE CLINIC | Age: 39
End: 2019-07-16

## 2019-07-16 RX ORDER — DICLOFENAC SODIUM 75 MG/1
75 TABLET, DELAYED RELEASE ORAL 2 TIMES DAILY
Qty: 20 TABLET | Refills: 3 | Status: SHIPPED | OUTPATIENT
Start: 2019-07-16 | End: 2021-06-25

## 2019-07-16 NOTE — TELEPHONE ENCOUNTER
Pt was in on Friday and received a cortisone shot I his foot. Pt is still having pain in the arch of his foot towards the heel. What should he do? The shot did help the outside of the foot. Can he get a refill on the pain meds-Tramadol.   [facility] on [address]

## 2019-07-22 ENCOUNTER — TELEPHONE (OUTPATIENT)
Dept: FAMILY MEDICINE CLINIC | Age: 39
End: 2019-07-22

## 2019-07-22 DIAGNOSIS — M72.2 PLANTAR FASCIITIS OF RIGHT FOOT: Primary | ICD-10-CM

## 2019-07-23 RX ORDER — IBUPROFEN 800 MG/1
800 TABLET ORAL 2 TIMES DAILY PRN
Qty: 60 TABLET | Refills: 0 | Status: SHIPPED | OUTPATIENT
Start: 2019-07-23 | End: 2019-07-23 | Stop reason: CLARIF

## 2019-10-24 DIAGNOSIS — M72.2 PLANTAR FASCIITIS, RIGHT: ICD-10-CM

## 2019-10-25 RX ORDER — TRAMADOL HYDROCHLORIDE 50 MG/1
50 TABLET ORAL EVERY 6 HOURS PRN
Qty: 20 TABLET | Refills: 0 | Status: SHIPPED | OUTPATIENT
Start: 2019-10-25 | End: 2020-03-11 | Stop reason: SDUPTHER

## 2020-03-11 RX ORDER — TRAMADOL HYDROCHLORIDE 50 MG/1
50 TABLET ORAL EVERY 6 HOURS PRN
Qty: 20 TABLET | Refills: 0 | Status: SHIPPED | OUTPATIENT
Start: 2020-03-11 | End: 2020-03-16

## 2020-07-01 ENCOUNTER — HOSPITAL ENCOUNTER (EMERGENCY)
Age: 40
Discharge: HOME | End: 2020-07-01

## 2020-07-01 VITALS
WEIGHT: 175.04 LBS | RESPIRATION RATE: 16 BRPM | BODY MASS INDEX: 25.06 KG/M2 | DIASTOLIC BLOOD PRESSURE: 77 MMHG | TEMPERATURE: 98.2 F | HEIGHT: 70 IN | OXYGEN SATURATION: 98 % | SYSTOLIC BLOOD PRESSURE: 118 MMHG | HEART RATE: 70 BPM

## 2020-07-01 PROCEDURE — 6370000000 HC RX 637 (ALT 250 FOR IP): Performed by: PHYSICIAN ASSISTANT

## 2020-07-01 PROCEDURE — 99282 EMERGENCY DEPT VISIT SF MDM: CPT

## 2020-07-01 RX ORDER — HYDROCODONE BITARTRATE AND ACETAMINOPHEN 5; 325 MG/1; MG/1
1 TABLET ORAL EVERY 8 HOURS PRN
Qty: 6 TABLET | Refills: 0 | Status: SHIPPED | OUTPATIENT
Start: 2020-07-01 | End: 2020-07-04

## 2020-07-01 RX ORDER — PENICILLIN V POTASSIUM 250 MG/1
500 TABLET ORAL ONCE
Status: COMPLETED | OUTPATIENT
Start: 2020-07-01 | End: 2020-07-01

## 2020-07-01 RX ORDER — PENICILLIN V POTASSIUM 500 MG/1
500 TABLET ORAL 4 TIMES DAILY
Qty: 28 TABLET | Refills: 0 | Status: SHIPPED | OUTPATIENT
Start: 2020-07-01 | End: 2020-07-08

## 2020-07-01 RX ADMIN — PENICILLIN V POTASIUM 500 MG: 250 TABLET ORAL at 09:42

## 2020-07-01 ASSESSMENT — ENCOUNTER SYMPTOMS
VOMITING: 0
DIARRHEA: 0
EYE PAIN: 0
SHORTNESS OF BREATH: 0
COUGH: 0
NAUSEA: 0
BACK PAIN: 0
ABDOMINAL PAIN: 0

## 2020-07-01 ASSESSMENT — PAIN DESCRIPTION - FREQUENCY: FREQUENCY: CONTINUOUS

## 2020-07-01 ASSESSMENT — PAIN DESCRIPTION - LOCATION: LOCATION: TEETH

## 2020-07-01 ASSESSMENT — PAIN DESCRIPTION - ORIENTATION: ORIENTATION: RIGHT;LOWER

## 2020-07-01 ASSESSMENT — PAIN DESCRIPTION - DESCRIPTORS: DESCRIPTORS: THROBBING

## 2020-07-01 ASSESSMENT — PAIN - FUNCTIONAL ASSESSMENT: PAIN_FUNCTIONAL_ASSESSMENT: 0-10

## 2020-07-01 ASSESSMENT — PAIN SCALES - GENERAL: PAINLEVEL_OUTOF10: 8

## 2020-07-01 ASSESSMENT — PAIN DESCRIPTION - ONSET: ONSET: GRADUAL

## 2020-07-01 ASSESSMENT — PAIN DESCRIPTION - PROGRESSION: CLINICAL_PROGRESSION: NOT CHANGED

## 2020-07-01 ASSESSMENT — PAIN DESCRIPTION - PAIN TYPE: TYPE: ACUTE PAIN

## 2020-07-01 NOTE — ED PROVIDER NOTES
[address]        Pt Name: Brandon Quevedo  MRN: [id number]  [place] 1980  Date of evaluation: 7/1/2020  Provider: LEXIS Turner  PCP: Antonio Alvarez MD    Evaluation by ISMAEL. My supervising physician was available for consultation. CHIEF COMPLAINT       Chief Complaint   Patient presents with    Dental Pain     right lower since 6/30       HISTORY OF PRESENT ILLNESS   (Location, Timing/Onset, Context/Setting, Quality, Duration, Modifying Factors, Severity, Associated Signs and Symptoms)  Note limiting factors. Brandon Quevedo is a 44 y.o. male who presents to enter the emergency department via personal vehicle for evaluation of severe right lower molar pain. Patient states that many years ago he had a root canal at this tooth and had a crown placed. He developed pain over the past 2 days. It is severe and rated as an 8 out of 10, described as throbbing and constant. He is tried taking extra strength ibuprofen and Tylenol, Orajel, without any relief of his pain. It is worse with eating. He called his dentist but they are full and cannot get him in for an appt until July 24. He states he is not able to sleep due to the severe pain. He denies fever, chills, headache. No history of IV drug use. He does have PMH of  Previous stomach ulcer. No Abdominal pain or melana. No recent travel, exposure to sick contacts, or recent antibiotics. No other acute concerns, associated symptoms or modifying factors. Nursing Notes were all reviewed and agreed with or any disagreements were addressed in the HPI. REVIEW OF SYSTEMS    (2-9 systems for level 4, 10 or more for level 5)     Review of Systems   Constitutional: Negative for chills, fatigue and fever. HENT: Positive for dental problem. Eyes: Negative for pain. Respiratory: Negative for cough and shortness of breath.     Cardiovascular: Negative for chest pain.   Gastrointestinal: Negative for abdominal pain, diarrhea, nausea and vomiting. Genitourinary: Negative for dysuria. Musculoskeletal: Negative for back pain, neck pain and neck stiffness. Skin: Negative for rash. Neurological: Negative for dizziness and headaches. Psychiatric/Behavioral: Negative for confusion. Positives and Pertinent negatives as per HPI. Except as noted above in the ROS, all other systems were reviewed and negative.        PAST MEDICAL HISTORY     Past Medical History:   Diagnosis Date    Cervical radiculopathy 2019    C5-6 Per EMG    Cervical stenosis of spine     Migraine     Neck pain     bulding discs and cervical stenosis    Stomach ulcer          SURGICAL HISTORY     Past Surgical History:   Procedure Laterality Date    HERNIA REPAIR Bilateral 2017    lap repair BIH, umb hernia    NECK SURGERY      C2-C6 with screws and charles    TONSILLECTOMY      WISDOM TOOTH EXTRACTION           CURRENTMEDICATIONS       Previous Medications    DICLOFENAC (VOLTAREN) 75 MG EC TABLET    Take 1 tablet by mouth 2 times daily for 10 days    DICLOFENAC SODIUM POWD    2 g by Does not apply route 3 times daily Formula 3D Baclo 2% Diclo 3% Kellen 6% Lido 2% Prilo 2% Pharm to comp    MULTIPLE VITAMIN (MULTI-VITAMIN DAILY PO)    Take by mouth         ALLERGIES     Bentyl [dicyclomine hcl] and Adhesive tape    FAMILYHISTORY       Family History   Problem Relation Age of Onset    Heart Disease Mother     Other Mother         COPD    Cancer Father 52        CANCER OF LARYNX    Colon Cancer Sister 43    Cancer Maternal Uncle         liver    Cancer Paternal Aunt         larynx    Heart Disease Maternal Grandmother     Cancer Maternal Uncle         from agent orange          SOCIAL HISTORY       Social History     Tobacco Use    Smoking status: Former Smoker     Packs/day: 0.00     Last attempt to quit: 10/28/2015     Years since quittin.6    Smokeless tobacco: Never Used   Substance Use Topics    Alcohol use: No     Comment: SOCIALLY- VERY RARE     Drug use: No       SCREENINGS             PHYSICAL EXAM    (up to 7 for level 4, 8 or more for level 5)     ED Triage Vitals [07/01/20 0928]   BP Temp Temp Source Pulse Resp SpO2 Height Weight   118/77 98.2 °F (36.8 °C) Oral 71 16 98 % 5' 10\" (1.778 m) 175 lb 0.7 oz (79.4 kg)       Physical Exam  Vitals signs and nursing note reviewed. Constitutional:       General: He is not in acute distress. Appearance: He is well-developed. He is not diaphoretic. HENT:      Head: Normocephalic and atraumatic. Mouth/Throat:      Dentition: Dental tenderness present. Pharynx: Oropharynx is clear. Uvula midline. Comments: #31 tender with percussion. Mild surrounding gingival swelling; no fluctuance. No sublingual edema. Eyes:      General:         Right eye: No discharge. Left eye: No discharge. Neck:      Musculoskeletal: Normal range of motion and neck supple. Pulmonary:      Effort: No respiratory distress. Breath sounds: No stridor. Musculoskeletal: Normal range of motion. Skin:     General: Skin is warm and dry. Coloration: Skin is not pale. Neurological:      Mental Status: He is alert and oriented to person, place, and time. Comments: No gross facial drooping. Moves all 4 extremities spontaneously. Psychiatric:         Behavior: Behavior normal.         DIAGNOSTIC RESULTS   LABS:    Labs Reviewed - No data to display    All other labs were within normal range or not returned as of this dictation. EKG: All EKG's are interpreted by the Emergency Department Physician in the absence of a cardiologist.  Please see their note for interpretation of EKG.       RADIOLOGY:   Non-plain film images such as CT, Ultrasound and MRI are read by the radiologist. Plain radiographic images are visualized and preliminarily interpreted by the ED Provider with the below findings:        Interpretation per the Radiologist below, if available at the time of this note:    No orders to display     No results found. PROCEDURES   Unless otherwise noted below, none     Procedures    CRITICAL CARE TIME   N/A    CONSULTS:  None      EMERGENCY DEPARTMENT COURSE and DIFFERENTIAL DIAGNOSIS/MDM:   Vitals:    Vitals:    07/01/20 0928   BP: 118/77   Pulse: 71   Resp: 16   Temp: 98.2 °F (36.8 °C)   TempSrc: Oral   SpO2: 98%   Weight: 175 lb 0.7 oz (79.4 kg)   Height: 5' 10\" (1.778 m)       Patient was given the following medications:  Medications   penicillin v potassium (VEETID) tablet 500 mg (500 mg Oral Given 7/1/20 0942)           Differential Diagnosis: Dental Abscess, Airway Obstruction, Marshal's Angina, Bacteremia/Sepsis. Patient seen and examined today for dental pain. See HPI for patient presentation. Patient is in no acute distress, nontoxic, afebrile with unremarkable vital signs. Right posterior molar #31 is covered with a crown. No other internal oral masses and NO sublingual edema or evidence of airway impairment. Very low suspicion for a deep space infection like Marshal's angina or retropharyngeal abscess. There is no evidence of airway compromise. Suspected early abscessed tooth . He has not had any relief with extra strength ibuprofen, Tylenol, Orajel. We will start the patient on penicillin, give short supply of hydrocodone for his severe pain. Checked OARRs report, no recent narcotic medications. At this time I believe patient's presentation does not warrant further workup with labs or imaging in the emergency department and is stable for discharge home. I instructed the patient to take the medications listed below as prescribed, and to follow up as an outpatient with a dentist.  I provided the patient with a list of local dental clinics in the discharge instructions.   I explained to the patient that it is advisable to arrive at the clinic early in the morning to ensure being seen. We also discussed returning to the Emergency Department immediately if new or worsening symptoms occur. We have discussed the symptoms which are most concerning (e.g., changing or worsening pain, trouble swallowing or breathing, neck stiffness or fever) that necessitate immediate return. FINAL IMPRESSION      1. Pain, dental    2. Dental infection          DISPOSITION/PLAN   DISPOSITION        PATIENT REFERREDTO:  follow up with your dentist [facility] Emergency Department  [address] [facility]  [phone]    If symptoms worsen      DISCHARGE MEDICATIONS:  New Prescriptions    HYDROCODONE-ACETAMINOPHEN (NORCO) 5-325 MG PER TABLET    Take 1 tablet by mouth every 8 hours as needed for Pain for up to 3 days. MAGIC MOUTHWASH (MIRACLE MOUTHWASH)    Swish and spit 5 mLs 4 times daily as needed for Dental Pain Equal parts 2% lidocaine, dyphenhydramine, antacid.     PENICILLIN V POTASSIUM (VEETID) 500 MG TABLET    Take 1 tablet by mouth 4 times daily for 7 days       DISCONTINUED MEDICATIONS:  Discontinued Medications    No medications on file              (Please note that portions of this note were completed with a voice recognition program.  Efforts were made to edit the dictations but occasionally words are mis-transcribed.)    LEXIS Paul (electronically signed)            LEXIS Paul  07/01/20 7621

## 2020-07-28 ENCOUNTER — HOSPITAL ENCOUNTER (EMERGENCY)
Age: 40
Discharge: HOME | End: 2020-07-28

## 2020-07-28 VITALS
HEART RATE: 70 BPM | DIASTOLIC BLOOD PRESSURE: 74 MMHG | SYSTOLIC BLOOD PRESSURE: 123 MMHG | WEIGHT: 170 LBS | TEMPERATURE: 98.5 F | OXYGEN SATURATION: 98 % | BODY MASS INDEX: 24.39 KG/M2 | RESPIRATION RATE: 18 BRPM

## 2020-07-28 PROCEDURE — 99282 EMERGENCY DEPT VISIT SF MDM: CPT

## 2020-07-28 RX ORDER — TRAMADOL HYDROCHLORIDE 50 MG/1
50 TABLET ORAL EVERY 6 HOURS PRN
Qty: 12 TABLET | Refills: 0 | Status: SHIPPED | OUTPATIENT
Start: 2020-07-28 | End: 2020-07-31

## 2020-07-28 RX ORDER — IBUPROFEN 600 MG/1
600 TABLET ORAL EVERY 6 HOURS PRN
Qty: 30 TABLET | Refills: 0 | Status: SHIPPED | OUTPATIENT
Start: 2020-07-28 | End: 2021-06-25

## 2020-07-28 RX ORDER — PENICILLIN V POTASSIUM 500 MG/1
500 TABLET ORAL 4 TIMES DAILY
Qty: 40 TABLET | Refills: 0 | Status: SHIPPED | OUTPATIENT
Start: 2020-07-28 | End: 2020-08-07

## 2020-07-28 ASSESSMENT — PAIN DESCRIPTION - DESCRIPTORS: DESCRIPTORS: ACHING

## 2020-07-28 ASSESSMENT — ENCOUNTER SYMPTOMS
SHORTNESS OF BREATH: 0
FACIAL SWELLING: 0
VOMITING: 0
VOICE CHANGE: 0
NAUSEA: 0
TROUBLE SWALLOWING: 0

## 2020-07-28 ASSESSMENT — PAIN DESCRIPTION - ORIENTATION: ORIENTATION: RIGHT

## 2020-07-28 ASSESSMENT — PAIN DESCRIPTION - PAIN TYPE: TYPE: ACUTE PAIN

## 2020-07-28 ASSESSMENT — PAIN SCALES - GENERAL
PAINLEVEL_OUTOF10: 7
PAINLEVEL_OUTOF10: 7

## 2020-07-28 ASSESSMENT — PAIN DESCRIPTION - ONSET: ONSET: ON-GOING

## 2020-07-28 ASSESSMENT — PAIN DESCRIPTION - LOCATION: LOCATION: TEETH

## 2020-07-28 ASSESSMENT — PAIN DESCRIPTION - PROGRESSION: CLINICAL_PROGRESSION: NOT CHANGED

## 2020-07-28 ASSESSMENT — PAIN DESCRIPTION - FREQUENCY: FREQUENCY: CONTINUOUS

## 2020-07-28 NOTE — ED PROVIDER NOTES
ulcer        SURGICAL HISTORY           Procedure Laterality Date    HERNIA REPAIR Bilateral 2017    lap repair BIH, umb hernia    NECK SURGERY  2014    C2-C6 with screws and charles    TONSILLECTOMY      WISDOM TOOTH EXTRACTION         CURRENT MEDICATIONS       Previous Medications    DICLOFENAC (VOLTAREN) 75 MG EC TABLET    Take 1 tablet by mouth 2 times daily for 10 days    DICLOFENAC SODIUM POWD    2 g by Does not apply route 3 times daily Formula 3D Baclo 2% Diclo 3% Kellen 6% Lido 2% Prilo 2% Pharm to comp    MAGIC MOUTHWASH (MIRACLE MOUTHWASH)    Swish and spit 5 mLs 4 times daily as needed for Dental Pain Equal parts 2% lidocaine, dyphenhydramine, antacid. MULTIPLE VITAMIN (MULTI-VITAMIN DAILY PO)    Take by mouth       ALLERGIES     Bentyl [dicyclomine hcl] and Adhesive tape    FAMILY HISTORY           Problem Relation Age of Onset    Heart Disease Mother     Other Mother         COPD    Cancer Father 52        CANCER OF LARYNX    Colon Cancer Sister 43    Cancer Maternal Uncle         liver    Cancer Paternal Aunt         larynx    Heart Disease Maternal Grandmother     Cancer Maternal Uncle         from agent orange     Family Status   Relation Name Status    Mother   at age 62    Father     [employer] Sister      MUnc  (Not Specified)    PAunt  (Not Specified)    MGM  (Not Specified)    MUnc  (Not Specified)        SOCIAL HISTORY    reports that he quit smoking about 4 years ago. He smoked 0.00 packs per day. He has never used smokeless tobacco. He reports that he does not drink alcohol or use drugs. PHYSICAL EXAM    (up to 7 for level 4, 8 or more for level 5)     ED Triage Vitals [20 0956]   BP Temp Temp Source Pulse Resp SpO2 Height Weight   123/74 98.5 °F (36.9 °C) Oral 70 18 98 % -- 170 lb (77.1 kg)       Physical Exam  Vitals signs and nursing note reviewed. Constitutional:       General: He is not in acute distress.      Appearance: He is well-developed. HENT:      Head: Normocephalic and atraumatic. Mouth/Throat:      Dentition: Abnormal dentition. Comments: No facial swelling  Neck:      Musculoskeletal: Neck supple. Pulmonary:      Effort: Pulmonary effort is normal. No respiratory distress. Musculoskeletal: Normal range of motion. Skin:     General: Skin is warm and dry. Neurological:      Mental Status: He is alert and oriented to person, place, and time. Psychiatric:         Behavior: Behavior normal.            EMERGENCY DEPARTMENT COURSE and DIFFERENTIAL DIAGNOSIS/MDM:   Vitals:    Vitals:    07/28/20 0956   BP: 123/74   Pulse: 70   Resp: 18   Temp: 98.5 °F (36.9 °C)   TempSrc: Oral   SpO2: 98%   Weight: 170 lb (77.1 kg)        I have evaluated this patient. My supervising physician was available for consultation. I estimate there is LOW risk for a DEEP SPACE INFECTION (e.g., TCS ANGINA OR RETROPHARYNGEAL ABSCESS), MENINGITIS, or AIRWAY COMPROMISE, thus I consider the discharge disposition reasonable. Also, there is no evidence or peritonitis, sepsis, or toxicity. Again the patient will be discharged with another round of antibiotics. I have stressed the importance of following up with a dentist and provided a dental clinic list.  I specifically pointed out that the [facility] clinic accepts the first 5 patients of the day so he should be there tomorrow morning at 6:30 AM to get a spot in line. At this point there is no evidence of large abscess for me to drain. Discussed results, diagnosis and plan with patient and/or family. Questions addressed. Dispositionand follow-up agreed upon. Specific discharge instructions explained. The patient and/or family and I have discussed the diagnosis and risks, and we agree with discharging home to follow-up with their primary care,specialist or referral doctor. We also discussed returning to the Emergency Department immediately if new or worsening symptoms occur.  We have discussed the symptoms which are most concerning that necessitate immediatereturn. PROCEDURES:  None    FINAL IMPRESSION      1. Pain, dental          DISPOSITION/PLAN   DISPOSITION Decision To Discharge 07/28/2020 10:07:09 AM      PATIENT REFERRED TO:  [facility]. [phone]  [id number] [address]  [phone]    Schedule an appointment as soon as possible for a visit   or see dental clinic list      MEDICATIONS:  New Prescriptions    IBUPROFEN (IBU) 600 MG TABLET    Take 1 tablet by mouth every 6 hours as needed for Pain    PENICILLIN V POTASSIUM (VEETID) 500 MG TABLET    Take 1 tablet by mouth 4 times daily for 10 days    TRAMADOL (ULTRAM) 50 MG TABLET    Take 1 tablet by mouth every 6 hours as needed for Pain for up to 3 days. Intended supply: 3 days.  Take lowest dose possible to manage pain       (Please note that portions of this note were completed with a voice recognition program.  Efforts were made toedit the dictations but occasionally words are mis-transcribed.)    RICKIE Fox PA-C  07/28/20 1018

## 2021-02-03 ENCOUNTER — HOSPITAL ENCOUNTER (EMERGENCY)
Age: 41
Discharge: HOME | End: 2021-02-03
Attending: EMERGENCY MEDICINE
Payer: COMMERCIAL

## 2021-02-03 VITALS
WEIGHT: 180 LBS | TEMPERATURE: 97.5 F | HEIGHT: 70 IN | BODY MASS INDEX: 25.77 KG/M2 | HEART RATE: 75 BPM | SYSTOLIC BLOOD PRESSURE: 127 MMHG | RESPIRATION RATE: 20 BRPM | DIASTOLIC BLOOD PRESSURE: 89 MMHG | OXYGEN SATURATION: 100 %

## 2021-02-03 DIAGNOSIS — R51.9 NONINTRACTABLE EPISODIC HEADACHE, UNSPECIFIED HEADACHE TYPE: Primary | ICD-10-CM

## 2021-02-03 PROCEDURE — 6370000000 HC RX 637 (ALT 250 FOR IP): Performed by: EMERGENCY MEDICINE

## 2021-02-03 PROCEDURE — 6360000002 HC RX W HCPCS: Performed by: EMERGENCY MEDICINE

## 2021-02-03 PROCEDURE — 99285 EMERGENCY DEPT VISIT HI MDM: CPT

## 2021-02-03 PROCEDURE — 96372 THER/PROPH/DIAG INJ SC/IM: CPT

## 2021-02-03 RX ORDER — SUMATRIPTAN 25 MG/1
25 TABLET, FILM COATED ORAL ONCE
Status: COMPLETED | OUTPATIENT
Start: 2021-02-03 | End: 2021-02-03

## 2021-02-03 RX ORDER — SUMATRIPTAN 100 MG/1
50 TABLET, FILM COATED ORAL
Qty: 12 TABLET | Refills: 0 | Status: SHIPPED | OUTPATIENT
Start: 2021-02-03 | End: 2022-02-17

## 2021-02-03 RX ORDER — METOCLOPRAMIDE HYDROCHLORIDE 5 MG/ML
10 INJECTION INTRAMUSCULAR; INTRAVENOUS ONCE
Status: COMPLETED | OUTPATIENT
Start: 2021-02-03 | End: 2021-02-03

## 2021-02-03 RX ORDER — KETOROLAC TROMETHAMINE 30 MG/ML
15 INJECTION, SOLUTION INTRAMUSCULAR; INTRAVENOUS ONCE
Status: COMPLETED | OUTPATIENT
Start: 2021-02-03 | End: 2021-02-03

## 2021-02-03 RX ORDER — DIPHENHYDRAMINE HCL 25 MG
25 TABLET ORAL ONCE
Status: COMPLETED | OUTPATIENT
Start: 2021-02-03 | End: 2021-02-03

## 2021-02-03 RX ADMIN — KETOROLAC TROMETHAMINE 15 MG: 30 INJECTION, SOLUTION INTRAMUSCULAR at 06:56

## 2021-02-03 RX ADMIN — METOCLOPRAMIDE HYDROCHLORIDE 10 MG: 5 INJECTION INTRAMUSCULAR; INTRAVENOUS at 05:56

## 2021-02-03 RX ADMIN — DIPHENHYDRAMINE HCL 25 MG: 25 TABLET ORAL at 05:56

## 2021-02-03 RX ADMIN — SUMATRIPTAN SUCCINATE 25 MG: 25 TABLET ORAL at 06:53

## 2021-02-03 ASSESSMENT — ENCOUNTER SYMPTOMS
WHEEZING: 0
PHOTOPHOBIA: 0
VOMITING: 0
CHEST TIGHTNESS: 0
RHINORRHEA: 0
NAUSEA: 0
BACK PAIN: 0
SHORTNESS OF BREATH: 0
COLOR CHANGE: 0

## 2021-02-03 ASSESSMENT — PAIN SCALES - GENERAL
PAINLEVEL_OUTOF10: 10
PAINLEVEL_OUTOF10: 10
PAINLEVEL_OUTOF10: 0

## 2021-02-03 ASSESSMENT — PAIN DESCRIPTION - PAIN TYPE: TYPE: ACUTE PAIN

## 2021-02-03 NOTE — ED NOTES
D/C: Order noted for d/c. Pt confirmed d/c paperwork and one prescription has correct name. Discharge and education instructions reviewed with patient. Teach-back successful. Pt verbalized understanding and signed d/c papers. Pt denied questions at this time. No acute distress noted. Patient instructed to follow-up as noted - return to emergency department if symptoms worsen. Patient verbalized understanding. Discharged per EDMD with discharge instructions. Pt discharged to private vehicle. Patient stable upon departure. Thanked patient for choosing [facility] for care. Provider aware of patient pain at time of discharge.        Dilan Coppola RN  02/03/21 0727

## 2021-02-03 NOTE — ED TRIAGE NOTES
Pt arrived to ED via private vehicle for a complaint of migraine headaches. Pt states that he has had migraines for years and this migraine started around midnight last night. Pt states that Imitrex usually helps and since he got new insurance, he has not been able to see a PCP yet. Pt states that he did have some vomiting earlier but is not feeling nauseous now. Pt denies any other concerns at this time. VS noted and stable. Patient A&Ox4. Respirations easy and unlabored. Skin warm and dry and appropriate for ethnicity. No acute distress noted at this time. Patient placed on continuous  pulse ox upon arrival to room.

## 2021-02-03 NOTE — ED PROVIDER NOTES
[address] [facility]      Pt Name: Yakelin Ernst  MRN: [id number]  [place] 1980  Date ofevaluation: 2/3/2021  Provider: Orquidea Shi MD    CHIEF COMPLAINT       Chief Complaint   Patient presents with    Migraine     since midnight         HISTORY OF PRESENT ILLNESS   (Location/Symptom, Timing/Onset,Context/Setting, Quality, Duration, Modifying Factors, Severity)  Note limiting factors. Yakelin Ernst is a 36 y.o. male  who  has a past medical history of Cervical radiculopathy, Cervical stenosis of spine, Migraine, Neck pain, and Stomach ulcer. who presents to the emergency department for evaluation of headache. Patient reports a frontal headache is been ongoing since midnight. He has had associated nausea vomiting photophobia. Patient states he is a history of chronic migraines and typically takes Imitrex. He is currently out of his medications and is awaiting to establish care with a primary care physician. He states that the headache is typical of his chronic headaches with no atypical features or new symptoms. Denies neck pain neck stiffness paresthesias or weakness. HPI    NursingNotes were reviewed. REVIEW OF SYSTEMS    (2-9 systems for level 4, 10 or more for level 5)     Review of Systems   Constitutional: Negative for activity change, chills and fever. HENT: Negative for congestion and rhinorrhea. Eyes: Negative for photophobia and visual disturbance. Respiratory: Negative for chest tightness, shortness of breath and wheezing. Cardiovascular: Negative for palpitations and leg swelling. Gastrointestinal: Negative for nausea and vomiting. Endocrine: Negative for polydipsia and polyuria. Genitourinary: Negative for difficulty urinating and frequency. Musculoskeletal: Negative for back pain and gait problem. Skin: Negative for color change and rash. Neurological: Positive for headaches.  Negative for dizziness, tremors, weakness, light-headedness and numbness. Psychiatric/Behavioral: Negative for confusion. The patient is not nervous/anxious. All other systems reviewed and are negative. Except as noted above the remainder of the review of systems was reviewed and negative. PAST MEDICAL HISTORY     Past Medical History:   Diagnosis Date    Cervical radiculopathy 2/21/2019    C5-6 Per EMG    Cervical stenosis of spine     Migraine     Neck pain     bulding discs and cervical stenosis    Stomach ulcer          SURGICALHISTORY       Past Surgical History:   Procedure Laterality Date    HERNIA REPAIR Bilateral 05/08/2017    lap repair BIH, umb hernia    NECK SURGERY  2014    C2-C6 with screws and charles    TONSILLECTOMY      WISDOM TOOTH EXTRACTION           CURRENT MEDICATIONS       Previous Medications    DICLOFENAC (VOLTAREN) 75 MG EC TABLET    Take 1 tablet by mouth 2 times daily for 10 days    DICLOFENAC SODIUM POWD    2 g by Does not apply route 3 times daily Formula 3D Baclo 2% Diclo 3% Kellen 6% Lido 2% Prilo 2% Pharm to comp    IBUPROFEN (IBU) 600 MG TABLET    Take 1 tablet by mouth every 6 hours as needed for Pain    MAGIC MOUTHWASH (MIRACLE MOUTHWASH)    Swish and spit 5 mLs 4 times daily as needed for Dental Pain Equal parts 2% lidocaine, dyphenhydramine, antacid.     MULTIPLE VITAMIN (MULTI-VITAMIN DAILY PO)    Take by mouth            Bentyl [dicyclomine hcl] and Adhesive tape    FAMILY HISTORY       Family History   Problem Relation Age of Onset    Heart Disease Mother     Other Mother         COPD    Cancer Father 52        CANCER OF LARYNX    Colon Cancer Sister 43    Cancer Maternal Uncle         liver    Cancer Paternal Aunt         larynx    Heart Disease Maternal Grandmother     Cancer Maternal Uncle         from agent orange          SOCIAL HISTORY       Social History     Socioeconomic History    Marital status: Single     Spouse name: None    Number of children: None    Years of education: None    Highest education level: None   Occupational History    None   Social Needs    Financial resource strain: None    Food insecurity     Worry: None     Inability: None    Transportation needs     Medical: None     Non-medical: None   Tobacco Use    Smoking status: Former Smoker     Packs/day: 0.00     Quit date: 10/28/2015     Years since quittin.2    Smokeless tobacco: Never Used   Substance and Sexual Activity    Alcohol use: No     Comment: SOCIALLY- VERY RARE     Drug use: No    Sexual activity: None   Lifestyle    Physical activity     Days per week: None     Minutes per session: None    Stress: None   Relationships    Social connections     Talks on phone: None     Gets together: None     Attends Tenriism service: None     Active member of club or organization: None     Attends meetings of clubs or organizations: None     Relationship status: None    Intimate partner violence     Fear of current or ex partner: None     Emotionally abused: None     Physically abused: None     Forced sexual activity: None   Other Topics Concern    None   Social History Narrative    None       SCREENINGS    [place] Coma Scale  Eye Opening: Spontaneous  Best Verbal Response: Oriented  Best Motor Response: Obeys commands  [place] Coma Scale Score: 15        PHYSICAL EXAM    (up to 7 for level 4, 8 or more for level 5)     ED Triage Vitals [21 0540]   BP Temp Temp Source Pulse Resp SpO2 Height Weight   (!) 88/55 97.5 °F (36.4 °C) Oral 75 20 100 % 5' 10\" (1.778 m) 180 lb (81.6 kg)       Physical Exam  Vitals signs and nursing note reviewed. Constitutional:       General: He is not in acute distress. Appearance: He is well-developed. HENT:      Head: Normocephalic and atraumatic. Eyes:      Conjunctiva/sclera: Conjunctivae normal.   Neck:      Musculoskeletal: Normal range of motion. Trachea: No tracheal deviation.    Cardiovascular:      Rate and Rhythm: Normal rate and regular rhythm. Pulmonary:      Effort: Pulmonary effort is normal.      Breath sounds: Normal breath sounds. No wheezing or rales. Abdominal:      General: There is no distension. Palpations: Abdomen is soft. Tenderness: There is no abdominal tenderness. Musculoskeletal: Normal range of motion. General: No deformity. Skin:     General: Skin is warm and dry. Capillary Refill: Capillary refill takes less than 2 seconds. Neurological:      General: No focal deficit present. Mental Status: He is alert and oriented to person, place, and time. Mental status is at baseline. Cranial Nerves: No cranial nerve deficit. Sensory: No sensory deficit. Motor: No weakness. Coordination: Coordination normal.      Gait: Gait normal.         RESULTS     EKG: All EKG's are interpreted by the Emergency Department Physician who either signs or Co-signsthis chart in the absence of a cardiologist.      RADIOLOGY:   Kelleen Jaye such as CT, Ultrasound and MRI are read by the radiologist. Plain radiographic images are visualized and preliminarily interpreted by the emergency physician with the below findings:      Interpretation per the Radiologist below, if available at the time ofthis note:    No orders to display         ED BEDSIDE ULTRASOUND:   Performed by ED Physician - none    LABS:  Labs Reviewed - No data to display    All other labs were within normal range or not returned as of this dictation.     EMERGENCY DEPARTMENT COURSE and DIFFERENTIAL DIAGNOSIS/MDM:   Vitals:    Vitals:    02/03/21 0540   BP: (!) 88/55   Pulse: 75   Resp: 20   Temp: 97.5 °F (36.4 °C)   TempSrc: Oral   SpO2: 100%   Weight: 180 lb (81.6 kg)   Height: 5' 10\" (1.778 m)       Patient was given thefollowing medications:  Medications   metoclopramide (REGLAN) injection 10 mg (10 mg Intramuscular Given 2/3/21 0556)   diphenhydrAMINE (BENADRYL) tablet 25 mg (25 mg Oral Given 2/3/21 6197)       ED COURSE & MEDICAL DECISION MAKING    Pertinent Labs & Imaging studies reviewed. (See chart for details)   -  Patient seen and evaluated in the emergency department. -  Triage and nursing notes reviewed and incorporated. -  Old chart records reviewed and incorporated. -  Differential diagnosis includes: Differential Diagnosis: Subarachnoid hemorrhage, Meningitis, Temporal arteritis, Pseudotumor Cerebri, Migraine, other    -  Work-up included:  See above  -  ED treatment included: See above  -  Results discussed with patient. Patient presents to the ED for evaluation of headache. He has a history of headaches and states that this is typical for his headaches. He has no atypical symptoms or new associated symptoms. Physical exam is unremarkable. I do not think he would benefit from imaging or lab work at this time. Patient treated symptomatically and feels improved on reevaluation. He is tolerating p.o.. Patient feels improved on reevaluation. Symptomatic treatment with expectant management discussed with the patient and the amenable to treatment plan and outpatient follow-up. Strict return precautions were discussed with the patient. They demonstrated understanding of when to return to the emergency department for new or worsening symptoms. .  The patient is agreeable with plan of care and disposition. REASSESSMENT          CRITICAL CARE TIME   Total Critical Care time was 10 minutes, excluding separatelyreportable procedures. There was a high probability ofclinically significant/life threatening deterioration in the patient's condition which required my urgent intervention. CONSULTS:  None    PROCEDURES:  Unless otherwise noted below, none     Procedures    FINAL IMPRESSION      1. Nonintractable episodic headache, unspecified headache type          DISPOSITION/PLAN   DISPOSITION        PATIENT REFERREDTO:  No follow-up provider specified.     DISCHARGEMEDICATIONS:  [place] Prescriptions    No medications on file          (Please note that portions of this note were completed with a voice recognition program.  Efforts were made to edit the dictations but occasionally words are mis-transcribed.)    Shruthi Bonilla MD (electronically signed)  Attending Emergency Physician          Shruthi Bonilla MD  02/03/21 0173

## 2021-03-23 ENCOUNTER — APPOINTMENT (OUTPATIENT)
Dept: CT IMAGING | Age: 41
End: 2021-03-23
Payer: COMMERCIAL

## 2021-03-23 ENCOUNTER — HOSPITAL ENCOUNTER (EMERGENCY)
Age: 41
Discharge: HOME | End: 2021-03-23
Attending: STUDENT IN AN ORGANIZED HEALTH CARE EDUCATION/TRAINING PROGRAM
Payer: COMMERCIAL

## 2021-03-23 VITALS
RESPIRATION RATE: 16 BRPM | DIASTOLIC BLOOD PRESSURE: 71 MMHG | TEMPERATURE: 97.5 F | HEART RATE: 84 BPM | OXYGEN SATURATION: 99 % | SYSTOLIC BLOOD PRESSURE: 132 MMHG

## 2021-03-23 DIAGNOSIS — G43.901 STATUS MIGRAINOSUS: Primary | ICD-10-CM

## 2021-03-23 LAB
A/G RATIO: 2.2 (ref 1.1–2.2)
ALBUMIN SERPL-MCNC: 4.8 G/DL (ref 3.4–5)
ALP BLD-CCNC: 82 U/L (ref 40–129)
ALT SERPL-CCNC: 46 U/L (ref 10–40)
ANION GAP SERPL CALCULATED.3IONS-SCNC: 14 MMOL/L (ref 3–16)
AST SERPL-CCNC: 37 U/L (ref 15–37)
BASOPHILS ABSOLUTE: 0 K/UL (ref 0–0.2)
BASOPHILS RELATIVE PERCENT: 0.3 %
BILIRUB SERPL-MCNC: 0.5 MG/DL (ref 0–1)
BUN BLDV-MCNC: 27 MG/DL (ref 7–20)
CALCIUM SERPL-MCNC: 9.6 MG/DL (ref 8.3–10.6)
CHLORIDE BLD-SCNC: 98 MMOL/L (ref 99–110)
CO2: 28 MMOL/L (ref 21–32)
CREAT SERPL-MCNC: 0.8 MG/DL (ref 0.9–1.3)
EOSINOPHILS ABSOLUTE: 0 K/UL (ref 0–0.6)
EOSINOPHILS RELATIVE PERCENT: 0.4 %
GFR AFRICAN AMERICAN: >60
GFR NON-AFRICAN AMERICAN: >60
GLOBULIN: 2.2 G/DL
GLUCOSE BLD-MCNC: 110 MG/DL (ref 70–99)
HCT VFR BLD CALC: 44.5 % (ref 40.5–52.5)
HEMOGLOBIN: 15.7 G/DL (ref 13.5–17.5)
LYMPHOCYTES ABSOLUTE: 0.9 K/UL (ref 1–5.1)
LYMPHOCYTES RELATIVE PERCENT: 9.5 %
MAGNESIUM: 2 MG/DL (ref 1.8–2.4)
MCH RBC QN AUTO: 31.3 PG (ref 26–34)
MCHC RBC AUTO-ENTMCNC: 35.2 G/DL (ref 31–36)
MCV RBC AUTO: 88.9 FL (ref 80–100)
MONOCYTES ABSOLUTE: 0.5 K/UL (ref 0–1.3)
MONOCYTES RELATIVE PERCENT: 5.2 %
NEUTROPHILS ABSOLUTE: 8.3 K/UL (ref 1.7–7.7)
NEUTROPHILS RELATIVE PERCENT: 84.6 %
PDW BLD-RTO: 12.7 % (ref 12.4–15.4)
PLATELET # BLD: 168 K/UL (ref 135–450)
PMV BLD AUTO: 8.6 FL (ref 5–10.5)
POTASSIUM REFLEX MAGNESIUM: 3.5 MMOL/L (ref 3.5–5.1)
RBC # BLD: 5.01 M/UL (ref 4.2–5.9)
SODIUM BLD-SCNC: 140 MMOL/L (ref 136–145)
TOTAL PROTEIN: 7 G/DL (ref 6.4–8.2)
WBC # BLD: 9.9 K/UL (ref 4–11)

## 2021-03-23 PROCEDURE — 2580000003 HC RX 258: Performed by: NURSE PRACTITIONER

## 2021-03-23 PROCEDURE — 99284 EMERGENCY DEPT VISIT MOD MDM: CPT

## 2021-03-23 PROCEDURE — 70498 CT ANGIOGRAPHY NECK: CPT

## 2021-03-23 PROCEDURE — 70450 CT HEAD/BRAIN W/O DYE: CPT

## 2021-03-23 PROCEDURE — 85025 COMPLETE CBC W/AUTO DIFF WBC: CPT

## 2021-03-23 PROCEDURE — 80053 COMPREHEN METABOLIC PANEL: CPT

## 2021-03-23 PROCEDURE — 6360000004 HC RX CONTRAST MEDICATION: Performed by: NURSE PRACTITIONER

## 2021-03-23 PROCEDURE — 96375 TX/PRO/DX INJ NEW DRUG ADDON: CPT

## 2021-03-23 PROCEDURE — 96374 THER/PROPH/DIAG INJ IV PUSH: CPT

## 2021-03-23 PROCEDURE — 6360000002 HC RX W HCPCS: Performed by: NURSE PRACTITIONER

## 2021-03-23 PROCEDURE — 83735 ASSAY OF MAGNESIUM: CPT

## 2021-03-23 RX ORDER — DEXAMETHASONE SODIUM PHOSPHATE 4 MG/ML
4 INJECTION, SOLUTION INTRA-ARTICULAR; INTRALESIONAL; INTRAMUSCULAR; INTRAVENOUS; SOFT TISSUE ONCE
Status: COMPLETED | OUTPATIENT
Start: 2021-03-23 | End: 2021-03-23

## 2021-03-23 RX ORDER — PROCHLORPERAZINE EDISYLATE 5 MG/ML
10 INJECTION INTRAMUSCULAR; INTRAVENOUS ONCE
Status: COMPLETED | OUTPATIENT
Start: 2021-03-23 | End: 2021-03-23

## 2021-03-23 RX ORDER — 0.9 % SODIUM CHLORIDE 0.9 %
1000 INTRAVENOUS SOLUTION INTRAVENOUS ONCE
Status: COMPLETED | OUTPATIENT
Start: 2021-03-23 | End: 2021-03-23

## 2021-03-23 RX ORDER — KETOROLAC TROMETHAMINE 30 MG/ML
30 INJECTION, SOLUTION INTRAMUSCULAR; INTRAVENOUS ONCE
Status: COMPLETED | OUTPATIENT
Start: 2021-03-23 | End: 2021-03-23

## 2021-03-23 RX ORDER — DIPHENHYDRAMINE HYDROCHLORIDE 50 MG/ML
25 INJECTION INTRAMUSCULAR; INTRAVENOUS ONCE
Status: COMPLETED | OUTPATIENT
Start: 2021-03-23 | End: 2021-03-23

## 2021-03-23 RX ADMIN — DEXAMETHASONE SODIUM PHOSPHATE 4 MG: 4 INJECTION, SOLUTION INTRAMUSCULAR; INTRAVENOUS at 01:33

## 2021-03-23 RX ADMIN — IOPAMIDOL 75 ML: 755 INJECTION, SOLUTION INTRAVENOUS at 02:55

## 2021-03-23 RX ADMIN — DIPHENHYDRAMINE HYDROCHLORIDE 25 MG: 50 INJECTION, SOLUTION INTRAMUSCULAR; INTRAVENOUS at 01:31

## 2021-03-23 RX ADMIN — PROCHLORPERAZINE EDISYLATE 10 MG: 5 INJECTION INTRAMUSCULAR; INTRAVENOUS at 01:29

## 2021-03-23 RX ADMIN — SODIUM CHLORIDE 1000 ML: 9 INJECTION, SOLUTION INTRAVENOUS at 01:27

## 2021-03-23 RX ADMIN — KETOROLAC TROMETHAMINE 30 MG: 30 INJECTION, SOLUTION INTRAMUSCULAR at 01:32

## 2021-03-23 ASSESSMENT — PAIN SCALES - GENERAL
PAINLEVEL_OUTOF10: 1
PAINLEVEL_OUTOF10: 0

## 2021-03-23 ASSESSMENT — PAIN DESCRIPTION - DESCRIPTORS: DESCRIPTORS: PATIENT UNABLE TO DESCRIBE

## 2021-03-23 NOTE — ED PROVIDER NOTES
[address] [facility]        Pt Name: Foreign Hilliard  MRN: [id number]  [place] 1980  Date of evaluation: 3/23/2021  Provider: LES Szymanski - CHARANJIT  PCP: Lyssa Guajardo MD     I have seen and evaluated this patient with my supervising physician Ny Maria MD.    CHIEF COMPLAINT       Chief Complaint   Patient presents with    Migraine     sudden onset, hx of migraines , states this is the worst ever. Patient states he took his usal medications without any relief. HISTORY OF PRESENT ILLNESS   (Location, Timing/Onset, Context/Setting, Quality, Duration, Modifying Factors, Severity, Associated Signs and Symptoms)  Note limiting factors. Foreign Hilliard is a 36 y.o. male medical history of cervical radiculopathy with cervical stenosis, stomach ulcer, migraines, hernia repair who presents to the ED with complaints of acute onset of left-sided headache that occurred 4 hours prior to arrival.  Patient states the pain radiates into his left eye causing blurry vision in bilateral eyes. He does note a history of migraine headaches that he gets daily. States usually he gets an aura with a pain into his eye, however this one was a more rapid onset. He did have some associated nausea and vomiting with the headache. States that usually he is able to take a dose of Imitrex 100 mg with relief of symptoms. He did take his Imitrex today and it did not relieve his symptoms. States that he is followed by his PCP for his migraines and he has not seen a neurologist in quite some time. States he was able to drive himself to the ED for evaluation. He said he is able to get a ride home if needed. He denies any recent head trauma, accidents, injuries, or falls.   He denies any associated numbness, tingling, weakness, chest pain, cough, fevers, rashes, neck pain, back pain, lightheaded, dizzy, syncope, tinnitus, rhinorrhea, phonophobia, dysphagia, sore throat, or confusion. He denies being anticoagulated. Former smoker, rare alcohol use, denies street drugs. I questioned the patient about his elevated blood pressure reading and he said he does not have a history of hypertension and said it is secondary to his headache. Patient is agitated during the exam and states that he is tired of being asked too many questions. Nursing Notes were all reviewed and agreed with or any disagreements were addressed in the HPI. REVIEW OF SYSTEMS    (2-9 systems for level 4, 10 or more for level 5)     Review of Systems    Positives and Pertinent negatives as per HPI. Except as noted above in the ROS, all other systems were reviewed and negative. PAST MEDICAL HISTORY     Past Medical History:   Diagnosis Date    Cervical radiculopathy 2/21/2019    C5-6 Per EMG    Cervical stenosis of spine     Migraine     Neck pain     bulding discs and cervical stenosis    Stomach ulcer          SURGICAL HISTORY     Past Surgical History:   Procedure Laterality Date    HERNIA REPAIR Bilateral 05/08/2017    lap repair BIH, umb hernia    NECK SURGERY  2014    C2-C6 with screws and charles    TONSILLECTOMY      WISDOM TOOTH EXTRACTION           CURRENTMEDICATIONS       Previous Medications    DICLOFENAC (VOLTAREN) 75 MG EC TABLET    Take 1 tablet by mouth 2 times daily for 10 days    DICLOFENAC SODIUM POWD    2 g by Does not apply route 3 times daily Formula 3D Baclo 2% Diclo 3% Kellen 6% Lido 2% Prilo 2% Pharm to comp    IBUPROFEN (IBU) 600 MG TABLET    Take 1 tablet by mouth every 6 hours as needed for Pain    MAGIC MOUTHWASH (MIRACLE MOUTHWASH)    Swish and spit 5 mLs 4 times daily as needed for Dental Pain Equal parts 2% lidocaine, dyphenhydramine, antacid.     MULTIPLE VITAMIN (MULTI-VITAMIN DAILY PO)    Take by mouth    SUMATRIPTAN (IMITREX) 100 MG TABLET    Take 0.5 tablets by mouth once as needed for Migraine         ALLERGIES     Bentyl [dicyclomine hcl] and Adhesive tape    FAMILYHISTORY       Family History   Problem Relation Age of Onset    Heart Disease Mother     Other Mother         COPD    Cancer Father 52        CANCER OF LARYNX    Colon Cancer Sister 43    Cancer Maternal Uncle         liver    Cancer Paternal Aunt         larynx    Heart Disease Maternal Grandmother     Cancer Maternal Uncle         from agent orange          SOCIAL HISTORY       Social History     Tobacco Use    Smoking status: Former Smoker     Packs/day: 0.00     Quit date: 10/28/2015     Years since quittin.4    Smokeless tobacco: Never Used   Substance Use Topics    Alcohol use: No     Comment: SOCIALLY- VERY RARE     Drug use: No       SCREENINGS             PHYSICAL EXAM    (up to 7 for level 4, 8 or more for level 5)     ED Triage Vitals [21 0105]   BP Temp Temp Source Pulse Resp SpO2 Height Weight   (!) 159/103 97.5 °F (36.4 °C) Oral 68 24 100 % -- --       Physical Exam  Vitals signs and nursing note reviewed. Constitutional:       General: He is awake. Appearance: Normal appearance. He is well-developed and normal weight. HENT:      Head: Normocephalic and atraumatic. Nose: Nose normal.   Eyes:      General: Lids are normal.         Right eye: No discharge. Left eye: No discharge. Extraocular Movements: Extraocular movements intact. Pupils: Pupils are equal, round, and reactive to light. Comments: photophobia   Neck:      Musculoskeletal: Full passive range of motion without pain and normal range of motion. No spinous process tenderness or muscular tenderness. Cardiovascular:      Rate and Rhythm: Normal rate and regular rhythm. Pulses:           Radial pulses are 2+ on the right side and 2+ on the left side. Dorsalis pedis pulses are 2+ on the right side and 2+ on the left side. Heart sounds: Normal heart sounds.    Pulmonary:      Effort: Pulmonary effort is normal. No respiratory distress. Breath sounds: Normal breath sounds. Abdominal:      General: Bowel sounds are normal.      Palpations: Abdomen is soft. Tenderness: There is no abdominal tenderness. Musculoskeletal: Normal range of motion. Thoracic back: He exhibits no tenderness and no bony tenderness. Lumbar back: He exhibits no tenderness and no bony tenderness. Right lower leg: No edema. Left lower leg: No edema. Skin:     General: Skin is warm and dry. Coloration: Skin is not pale. Neurological:      General: No focal deficit present. Mental Status: He is alert and oriented to person, place, and time. Cranial Nerves: Cranial nerves are intact. Sensory: Sensation is intact. Motor: Motor function is intact. Coordination: Coordination is intact. Gait: Gait is intact. Psychiatric:         Behavior: Behavior is agitated. Behavior is cooperative. DIAGNOSTIC RESULTS   LABS:    Labs Reviewed   CBC WITH AUTO DIFFERENTIAL - Abnormal; Notable for the following components:       Result Value    Neutrophils Absolute 8.3 (*)     Lymphocytes Absolute 0.9 (*)     All other components within normal limits    Narrative:     Performed at:  [facility]  [address] [facility] [address]   Phone [phone]   COMPREHENSIVE METABOLIC PANEL W/ REFLEX TO MG FOR LOW K - Abnormal; Notable for the following components:    Chloride 98 (*)     Glucose 110 (*)     BUN 27 (*)     CREATININE 0.8 (*)     ALT 46 (*)     All other components within normal limits    Narrative:     Performed at:  [facility]  [address] [place], [place] [address]   Phone [phone]   MAGNESIUM       All other labs were within normal range or not returned as of this dictation. EKG:  All EKG's are interpreted by the Emergency Department Physician in the absence of a cardiologist.  Please see their note for interpretation of EKG. RADIOLOGY:   Non-plain film images such as CT, Ultrasound and MRI are read by the radiologist. Plain radiographic images are visualized and preliminarily interpreted by the ED Provider with the below findings:        Interpretation per the Radiologist below, if available at the time of this note:    CT Head WO Contrast    (Results Pending)    [address]    (Results Pending)     No results found. PROCEDURES   Unless otherwise noted below, none     Procedures    CRITICAL CARE TIME   N/A    CONSULTS:  None      EMERGENCY DEPARTMENT COURSE and DIFFERENTIAL DIAGNOSIS/MDM:   Vitals:    Vitals:    03/23/21 0105 03/23/21 0200   BP: (!) 159/103 115/74   Pulse: 68 70   Resp: 24 18   Temp: 97.5 °F (36.4 °C)    TempSrc: Oral    SpO2: 100% 99%       Patient was given the following medications:  Medications   iopamidol (ISOVUE-370) 76 % injection 75 mL (has no administration in time range)   0.9 % sodium chloride bolus (1,000 mLs Intravenous New Bag 3/23/21 0127)   dexamethasone (DECADRON) injection 4 mg (4 mg Intravenous Given 3/23/21 0133)   prochlorperazine (COMPAZINE) injection 10 mg (10 mg Intravenous Given 3/23/21 0129)   diphenhydrAMINE (BENADRYL) injection 25 mg (25 mg Intravenous Given 3/23/21 0131)   ketorolac (TORADOL) injection 30 mg (30 mg Intravenous Given 3/23/21 0132)           Pertinent Labs & Imaging studies reviewed. (See chart for details)   -  Patient seen and evaluated in the emergency department. -  Triage and nursing notes reviewed and incorporated. -  Old chart records reviewed and incorporated. -  Patient case discussed with attending physician, Dr. Sp Menendez. They saw and examined patient.   -  Differential diagnosis includes:  SAH, ICH, CVA, TIA, SDH, meningitis, aneurysm, dissection, versus COVID-19  -  Work-up included:  See above CBC, CMP, CT head without, CTA head and neck  -  ED treatment included:  Normal saline, Decadron, Compazine, Benadryl, Toradol  - Consults:none   -  Results discussed with patient. Labs show  CBC with absolute neuts 8.3, absolute lymphs 0.9. CMP with chloride 98, glucose 110, BUN 27, creatinine 0.8, ALT 46. Patient states he is feeling much better after the medication and is resting quietly on the stretcher. Vital signs are normal.  0300 Patient care turned over to Dr. Melvina Marcum pending final radiology results and disposition. CRITICAL CARE TIME   Total Critical Care time was 20 minutes, excluding separately reportable procedures. There was a high probability of clinically significant/life threatening deterioration in the patient's condition which required my urgent intervention. FINAL IMPRESSION      1.  Migraine without status migrainosus, not intractable, unspecified migraine type          DISPOSITION/PLAN   DISPOSITION             (Please note that portions of this note were completed with a voice recognition program.  Efforts were made to edit the dictations but occasionally words are mis-transcribed.)    LES Morrissey CNP (electronically signed)            LES Morrissey CNP  03/24/21 8404

## 2021-03-23 NOTE — ED TRIAGE NOTES
Patient presents to ED complaining of headache behind the eyes which started suddenly around 4 hours ago. Patient states he took his imitrex without relief. States this is the worst migraine he has ever had. Patient rolling on cot and groaning in pain at thistime. Patient alert and oriented on arrival. Reports blurred vision, states he has had that with migraines before.

## 2021-03-24 NOTE — ED PROVIDER NOTES
Attending Supervisory Note/Shared Visit   I have personally performed a face to face diagnostic evaluation on this patient. I have reviewed the mid-levels findings and agree. History and Exam by me shows a 35 yo M who p/w signs and symptoms consistent with his typical migraine headaches, headache occurred 4 hours prior to arrival.  At the time my evaluation, patient has a normal CT brain which is within the window of being able to detect a subarachnoid hemorrhage. It was negative for this. His pain was not responsive to his home medications. He was given a migraine cocktail as ordered below in the emergency department with near complete resolution of his pain. He has no red flag features on my examination, he is afebrile, hemodynamically stable, no nuchal rigidity, alert and oriented x4, cranial nerves II through XII intact, strength 5 out of 5 throughout, sensation intact throughout. No ataxia, no dysmetria, no instability on evaluation of his gait in the emergency department. He is given follow-up with neurology, feeling better, tolerates p.o., amenable to discharge home.      Medications   0.9 % sodium chloride bolus (0 mLs Intravenous Stopped 3/23/21 0425)   dexamethasone (DECADRON) injection 4 mg (4 mg Intravenous Given 3/23/21 0133)   prochlorperazine (COMPAZINE) injection 10 mg (10 mg Intravenous Given 3/23/21 0129)   diphenhydrAMINE (BENADRYL) injection 25 mg (25 mg Intravenous Given 3/23/21 0131)   ketorolac (TORADOL) injection 30 mg (30 mg Intravenous Given 3/23/21 0132)   iopamidol (ISOVUE-370) 76 % injection 75 mL (75 mLs Intravenous Given 3/23/21 0255)     Labs Reviewed   CBC WITH AUTO DIFFERENTIAL - Abnormal; Notable for the following components:       Result Value    Neutrophils Absolute 8.3 (*)     Lymphocytes Absolute 0.9 (*)     All other components within normal limits    Narrative:     Performed at:  [place] [address] Phone [phone]   COMPREHENSIVE METABOLIC PANEL W/ REFLEX TO MG FOR LOW K - Abnormal; Notable for the following components:    Chloride 98 (*)     Glucose 110 (*)     BUN 27 (*)     CREATININE 0.8 (*)     ALT 46 (*)     All other components within normal limits    Narrative:     Performed at:  [facility]  [address] [place], [place] [address]   Phone [phone]   MAGNESIUM    Narrative:     Performed at:  [facility]  [address] [place], [place] [address]   Phone [phone]         Lenin Smith MD  Attending Emergency Physician         Lenin Smith MD  03/24/21 0141

## 2021-04-01 ENCOUNTER — PATIENT MESSAGE (OUTPATIENT)
Dept: FAMILY MEDICINE CLINIC | Age: 41
End: 2021-04-01

## 2021-04-01 NOTE — TELEPHONE ENCOUNTER
From: Twila Cuevas  To: Shaaron Lesches, MD  Sent: 4/1/2021 4:46 PM EDT  Subject: Prescription Question    I was in there on March 23rd and I believe I was given prescriptions but I'm not sure what pharmacy they were sent to I was wondering if you could tell me what pharmacy they were sent to

## 2021-06-21 ENCOUNTER — HOSPITAL ENCOUNTER (EMERGENCY)
Age: 41
Discharge: HOME | End: 2021-06-21
Payer: COMMERCIAL

## 2021-06-21 VITALS
HEIGHT: 70 IN | RESPIRATION RATE: 16 BRPM | BODY MASS INDEX: 26.63 KG/M2 | WEIGHT: 185.98 LBS | HEART RATE: 71 BPM | DIASTOLIC BLOOD PRESSURE: 98 MMHG | TEMPERATURE: 97.6 F | SYSTOLIC BLOOD PRESSURE: 154 MMHG | OXYGEN SATURATION: 98 %

## 2021-06-21 DIAGNOSIS — S05.01XA ABRASION OF RIGHT CORNEA, INITIAL ENCOUNTER: Primary | ICD-10-CM

## 2021-06-21 DIAGNOSIS — Z23 NEED FOR TETANUS BOOSTER: ICD-10-CM
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
## 2021-06-21 PROCEDURE — 90471 IMMUNIZATION ADMIN: CPT | Performed by: NURSE PRACTITIONER

## 2021-06-21 PROCEDURE — 90715 TDAP VACCINE 7 YRS/> IM: CPT | Performed by: NURSE PRACTITIONER

## 2021-06-21 PROCEDURE — 99283 EMERGENCY DEPT VISIT LOW MDM: CPT

## 2021-06-21 PROCEDURE — 6370000000 HC RX 637 (ALT 250 FOR IP): Performed by: NURSE PRACTITIONER

## 2021-06-21 PROCEDURE — 6360000002 HC RX W HCPCS: Performed by: NURSE PRACTITIONER

## 2021-06-21 RX ORDER — ERYTHROMYCIN 5 MG/G
OINTMENT OPHTHALMIC ONCE
Status: COMPLETED | OUTPATIENT
Start: 2021-06-21 | End: 2021-06-21

## 2021-06-21 RX ORDER — TETRACAINE HYDROCHLORIDE 5 MG/ML
1 SOLUTION OPHTHALMIC ONCE
Status: COMPLETED | OUTPATIENT
Start: 2021-06-21 | End: 2021-06-21

## 2021-06-21 RX ORDER — ERYTHROMYCIN 5 MG/G
OINTMENT OPHTHALMIC
Qty: 1 TUBE | Refills: 0 | Status: SHIPPED | OUTPATIENT
Start: 2021-06-21 | End: 2021-09-13 | Stop reason: ALTCHOICE

## 2021-06-21 RX ADMIN — TETRACAINE HYDROCHLORIDE 1 DROP: 5 SOLUTION OPHTHALMIC at 18:05

## 2021-06-21 RX ADMIN — TETANUS TOXOID, REDUCED DIPHTHERIA TOXOID AND ACELLULAR PERTUSSIS VACCINE, ADSORBED 0.5 ML: 5; 2.5; 8; 8; 2.5 SUSPENSION INTRAMUSCULAR at 18:11

## 2021-06-21 RX ADMIN — FLUORESCEIN SODIUM 1 MG: 1 STRIP OPHTHALMIC at 18:04

## 2021-06-21 RX ADMIN — ERYTHROMYCIN: 5 OINTMENT OPHTHALMIC at 18:04

## 2021-06-21 ASSESSMENT — PAIN DESCRIPTION - LOCATION: LOCATION: EYE

## 2021-06-21 ASSESSMENT — PAIN DESCRIPTION - ORIENTATION: ORIENTATION: RIGHT

## 2021-06-21 ASSESSMENT — ENCOUNTER SYMPTOMS
COLOR CHANGE: 0
EYE REDNESS: 1
EYE PAIN: 1

## 2021-06-21 ASSESSMENT — PAIN SCALES - GENERAL: PAINLEVEL_OUTOF10: 8

## 2021-06-21 ASSESSMENT — PAIN DESCRIPTION - PAIN TYPE: TYPE: ACUTE PAIN

## 2021-06-21 NOTE — ED PROVIDER NOTES
**ADVANCED PRACTICE PROVIDER, I HAVE EVALUATED THIS PATIENT**        [address] [facility] ENCOUNTER      Pt Name: Carroll Mckinley  TIJ:[id number]  [place] 1980  Date of evaluation: 6/21/2021  Provider: LES Ravi - CNP      Chief Complaint:    Chief Complaint   Patient presents with    Foreign Body in Eye     right eye, was cutting iron and the piece went under the glasses into the eye          Nursing Notes, Past Medical Hx, Past Surgical Hx, Social Hx, Allergies, and Family Hx were all reviewed and agreed with or any disagreements were addressed in the HPI.    HPI: (Location, Duration, Timing, Severity, Quality, Assoc Sx, Context, Modifying factors)  This is a  36 y.o. male who presents to the emergency department today complaining of right eye pain. Onset was earlier today. The context was he was cutting metal and felt a piece go under his safety glasses into his right eye. He does not see any foreign body. No visual disturbances. Tetanus status unknown.     PastMedical/Surgical History:      Diagnosis Date    Cervical radiculopathy 2/21/2019    C5-6 Per EMG    Cervical stenosis of spine     Migraine     Neck pain     bulding discs and cervical stenosis    Stomach ulcer          Procedure Laterality Date    HERNIA REPAIR Bilateral 05/08/2017    lap repair BIH, umb hernia    NECK SURGERY  2014    C2-C6 with screws and charles    TONSILLECTOMY      WISDOM TOOTH EXTRACTION         Medications:  Previous Medications    DICLOFENAC (VOLTAREN) 75 MG EC TABLET    Take 1 tablet by mouth 2 times daily for 10 days    DICLOFENAC SODIUM POWD    2 g by Does not apply route 3 times daily Formula 3D Baclo 2% Diclo 3% Kellen 6% Lido 2% Prilo 2% Pharm to comp    IBUPROFEN (IBU) 600 MG TABLET    Take 1 tablet by mouth every 6 hours as needed for Pain    MAGIC MOUTHWASH (MIRACLE MOUTHWASH)    Swish and spit 5 mLs 4 times daily as needed for Dental Pain Equal parts 2% lidocaine, dyphenhydramine, antacid. MULTIPLE VITAMIN (MULTI-VITAMIN DAILY PO)    Take by mouth    SUMATRIPTAN (IMITREX) 100 MG TABLET    Take 0.5 tablets by mouth once as needed for Migraine         Review of Systems:  (2-9 systems needed)  Review of Systems   Constitutional: Negative for chills, diaphoresis and fever. HENT: Negative. Eyes: Positive for pain and redness. Negative for visual disturbance. Skin: Negative for color change and wound. Allergic/Immunologic: Negative for immunocompromised state. Neurological: Negative for dizziness, syncope and headaches. Psychiatric/Behavioral: Negative for confusion. All other systems reviewed and are negative. Physical Exam:  Physical Exam  Vitals and nursing note reviewed. Constitutional:       General: He is not in acute distress. Appearance: Normal appearance. He is well-developed. He is not toxic-appearing. HENT:      Head: Normocephalic and atraumatic. Eyes:      General: No scleral icterus. Right eye: No foreign body. Extraocular Movements: Extraocular movements intact. Conjunctiva/sclera:      Right eye: Right conjunctiva is injected. Pupils: Pupils are equal, round, and reactive to light. Right eye: Corneal abrasion (1-2mm) present. Comments: Pupils equally round and reactive to light, extraocular movements intact, conjunctiva of the affected eye is injected, no chemosis, no discharge, no hyphema, no hypopyon, no pain when contralateral eye is exposed to light, no foreign body visualized, globe intact without deformity,     Woods-lamp examination: + fluorescein uptake, no Antonette sign, no dendritic or pseudodendritic lesions   Neck:      Vascular: No JVD. Cardiovascular:      Rate and Rhythm: Normal rate and regular rhythm. Heart sounds: Normal heart sounds. Pulmonary:      Effort: Pulmonary effort is normal. No respiratory distress. Breath sounds: Normal breath sounds.

## 2021-06-25 ENCOUNTER — APPOINTMENT (OUTPATIENT)
Dept: GENERAL RADIOLOGY | Age: 41
End: 2021-06-25
Payer: COMMERCIAL

## 2021-06-25 ENCOUNTER — HOSPITAL ENCOUNTER (EMERGENCY)
Age: 41
Discharge: HOME | End: 2021-06-25
Payer: COMMERCIAL

## 2021-06-25 VITALS
WEIGHT: 184.97 LBS | RESPIRATION RATE: 18 BRPM | DIASTOLIC BLOOD PRESSURE: 91 MMHG | SYSTOLIC BLOOD PRESSURE: 131 MMHG | HEIGHT: 70 IN | HEART RATE: 82 BPM | TEMPERATURE: 98.5 F | BODY MASS INDEX: 26.48 KG/M2 | OXYGEN SATURATION: 97 %

## 2021-06-25 DIAGNOSIS — S80.11XA CONTUSION OF RIGHT LOWER LEG, INITIAL ENCOUNTER: Primary | ICD-10-CM

## 2021-06-25 PROCEDURE — 99284 EMERGENCY DEPT VISIT MOD MDM: CPT

## 2021-06-25 PROCEDURE — 73560 X-RAY EXAM OF KNEE 1 OR 2: CPT

## 2021-06-25 PROCEDURE — 73610 X-RAY EXAM OF ANKLE: CPT

## 2021-06-25 PROCEDURE — 73630 X-RAY EXAM OF FOOT: CPT

## 2021-06-25 RX ORDER — DICLOFENAC SODIUM 75 MG/1
75 TABLET, DELAYED RELEASE ORAL 2 TIMES DAILY
COMMUNITY
Start: 2021-06-04 | End: 2021-11-10

## 2021-06-25 RX ORDER — SUMATRIPTAN SUCCINATE 6 MG/.5ML
INJECTION, SOLUTION SUBCUTANEOUS
COMMUNITY
Start: 2021-04-15 | End: 2021-06-25

## 2021-06-25 RX ORDER — ACETAMINOPHEN 500 MG
1000 TABLET ORAL EVERY 6 HOURS PRN
Qty: 40 TABLET | Refills: 0 | Status: SHIPPED | OUTPATIENT
Start: 2021-06-25 | End: 2021-11-12

## 2021-06-25 RX ORDER — GABAPENTIN 300 MG/1
CAPSULE ORAL
COMMUNITY
Start: 2021-05-07 | End: 2022-02-17 | Stop reason: ALTCHOICE

## 2021-06-25 RX ORDER — VERAPAMIL HYDROCHLORIDE 80 MG/1
TABLET ORAL
COMMUNITY
Start: 2021-06-23 | End: 2021-09-13 | Stop reason: ALTCHOICE

## 2021-06-25 ASSESSMENT — PAIN DESCRIPTION - DESCRIPTORS: DESCRIPTORS: ACHING;SHARP

## 2021-06-25 ASSESSMENT — PAIN DESCRIPTION - LOCATION: LOCATION: LEG

## 2021-06-25 ASSESSMENT — PAIN SCALES - GENERAL
PAINLEVEL_OUTOF10: 10
PAINLEVEL_OUTOF10: 10

## 2021-06-25 ASSESSMENT — PAIN DESCRIPTION - PAIN TYPE: TYPE: ACUTE PAIN

## 2021-06-25 ASSESSMENT — PAIN DESCRIPTION - PROGRESSION: CLINICAL_PROGRESSION: NOT CHANGED

## 2021-06-25 ASSESSMENT — PAIN DESCRIPTION - ORIENTATION: ORIENTATION: RIGHT

## 2021-06-25 NOTE — ED NOTES
Pt discharged from ED to home. Pt verbalizes understanding to discharge instructions, teach back successful. Pt denies questions at this time. No acute distress noted. Resp even and unlabored. A/ox4. Pt instructed to follow-up as noted - return to ED if symptoms worsen. Pt verbalizes understanding. Discharged per ED PA with discharge instructions. Pt refuses ambulatory assistance to lobby and walks with steady gait.         Geremias Bautista RN  06/25/21 3326

## 2021-06-27 ASSESSMENT — ENCOUNTER SYMPTOMS
COUGH: 0
NAUSEA: 0
BACK PAIN: 0
SHORTNESS OF BREATH: 0
ABDOMINAL PAIN: 0
VOMITING: 0

## 2021-06-27 NOTE — ED PROVIDER NOTES
[address] [facility]        Pt Name: Ewa Trejo  MRN: [id number]  [place] 1980  Date of evaluation: 6/25/2021  Provider: LEXIS Franklin  PCP: Eulalio Paul MD  Note Started: 1:54 PM EDT       ISMAEL. I have evaluated this patient. My supervising physician was available for consultation. CHIEF COMPLAINT       Chief Complaint   Patient presents with    Leg Pain     R knee ankle and foot, pt had a iron plate fall on it while helping a friend move       HISTORY OF PRESENT ILLNESS   (Location, Timing/Onset, Context/Setting, Quality, Duration, Modifying Factors, Severity, Associated Signs and Symptoms)  Note limiting factors. Chief Complaint: Right leg pain     Ewa Trejo is a 36 y.o. male who presents to the emergency department with a chief complaint of right leg pain. Was helping a friend move, a iron plate fell onto his right lower leg and ankle. Was under plate for approximately 5 minutes before they were able to lift the plate. Notes pain local to ankle and lower leg. Denies numbness, weakness, limited or painful ROM, calf swelling or pain. Nursing Notes were all reviewed and agreed with or any disagreements were addressed in the HPI. REVIEW OF SYSTEMS    (2-9 systems for level 4, 10 or more for level 5)     Review of Systems   Constitutional: Negative for fever. Respiratory: Negative for cough and shortness of breath. Cardiovascular: Negative for chest pain. Gastrointestinal: Negative for abdominal pain, nausea and vomiting. Musculoskeletal: Negative for back pain and neck pain.        + Right ankle and lower leg pain   Skin: Negative for rash. Neurological: Negative for weakness, numbness and headaches. Psychiatric/Behavioral: Negative for confusion. Positives and Pertinent negatives as per HPI. Except as noted above in the ROS, all other systems were reviewed and negative.        PAST MEDICAL HISTORY     Past Medical History:   Diagnosis Date    Cervical radiculopathy 2019    C5-6 Per EMG    Cervical stenosis of spine     Cluster headaches     Migraine     Neck pain     bulding discs and cervical stenosis    Stomach ulcer          SURGICAL HISTORY     Past Surgical History:   Procedure Laterality Date    HERNIA REPAIR Bilateral 2017    lap repair BIH, umb hernia    NECK SURGERY      C2-C6 with screws and charles    TONSILLECTOMY      WISDOM TOOTH EXTRACTION           CURRENTMEDICATIONS       Discharge Medication List as of 2021  6:11 PM      CONTINUE these medications which have NOT CHANGED    Details   diclofenac (VOLTAREN) 75 MG EC tablet Take 75 mg by mouth 2 times dailyHistorical Med      gabapentin (NEURONTIN) 300 MG capsule Historical Med      verapamil (CALAN) 80 MG tablet Historical Med      erythromycin (ROMYCIN) 5 MG/GM ophthalmic ointment Apply half-inch ribbon to the lower inner canthus 4 times daily for 5 days, Disp-1 Tube, R-0, Print      SUMAtriptan (IMITREX) 100 MG tablet Take 0.5 tablets by mouth once as needed for Migraine, Disp-12 tablet, R-0Normal      Magic Mouthwash (MIRACLE MOUTHWASH) Swish and spit 5 mLs 4 times daily as needed for Dental Pain Equal parts 2% lidocaine, dyphenhydramine, antacid., Disp-240 mL, R-0Print               ALLERGIES     Bentyl [dicyclomine hcl] and Adhesive tape    FAMILYHISTORY       Family History   Problem Relation Age of Onset    Heart Disease Mother     Other Mother         COPD    Cancer Father 52        CANCER OF LARYNX    Colon Cancer Sister 43    Cancer Maternal Uncle         liver    Cancer Paternal Aunt         larynx    Heart Disease Maternal Grandmother     Cancer Maternal Uncle         from agent orange          SOCIAL HISTORY       Social History     Tobacco Use    Smoking status: Former Smoker     Packs/day: 0.00     Quit date: 10/28/2015     Years since quittin.6    Smokeless tobacco: Never Used   Vaping Use    Vaping Use: Never used   Substance Use Topics    Alcohol use: No     Comment: SOCIALLY- VERY RARE     Drug use: No       SCREENINGS             PHYSICAL EXAM    (up to 7 for level 4, 8 or more for level 5)     ED Triage Vitals [06/25/21 1650]   BP Temp Temp Source Pulse Resp SpO2 Height Weight   (!) 131/91 98.5 °F (36.9 °C) Temporal 82 18 97 % 5' 10\" (1.778 m) 184 lb 15.5 oz (83.9 kg)       Physical Exam  Vitals reviewed. Constitutional:       Appearance: He is not diaphoretic. HENT:      Nose: No congestion or rhinorrhea. Eyes:      General: No scleral icterus. Conjunctiva/sclera: Conjunctivae normal.   Cardiovascular:      Pulses: Normal pulses. Pulmonary:      Effort: Pulmonary effort is normal. No respiratory distress. Breath sounds: No stridor. Musculoskeletal:         General: Tenderness present. No swelling. Normal range of motion. Cervical back: Normal range of motion and neck supple. Comments: Mild TTP of right lower leg and lateral ankle without malleolar tenderness of laxity. Compartments soft. No pain with passive or active ROM of ankle, toes. No right knee tenderness of laxity. 2+ DP and PT pulses bilaterally. Distal motor and sensory intact. Skin:     General: Skin is warm and dry. Capillary Refill: Capillary refill takes less than 2 seconds. Neurological:      General: No focal deficit present. Mental Status: He is alert and oriented to person, place, and time. Sensory: No sensory deficit. Motor: No weakness. Gait: Gait normal.   Psychiatric:         Mood and Affect: Mood normal.         Behavior: Behavior normal.         DIAGNOSTIC RESULTS   LABS:    Labs Reviewed - No data to display    All other labs were within normal range or not returned as of this dictation. EKG:  All EKG's are interpreted by the Emergency Department Physician in the absence of a cardiologist.  Please see their note for interpretation of EKG.    RADIOLOGY:   Non-plain film images such as CT, Ultrasound and MRI are read by the radiologist. Plain radiographic images are visualized and preliminarily interpreted by the ED Provider with the below findings:        Interpretation per the Radiologist below, if available at the time of this note:    XR ANKLE RIGHT (MIN 3 VIEWS)   Final Result   No acute abnormality of the right knee, right ankle or right foot. No   fracture or dislocation. XR FOOT RIGHT (MIN 3 VIEWS)   Final Result   No acute abnormality of the right knee, right ankle or right foot. No   fracture or dislocation. XR KNEE RIGHT (1-2 VIEWS)   Final Result   No acute abnormality of the right knee, right ankle or right foot. No   fracture or dislocation. XR KNEE RIGHT (1-2 VIEWS)    Result Date: 6/25/2021  EXAMINATION: 3 XRAY VIEWS OF THE RIGHT FOOT; 2 XRAY VIEWS OF THE RIGHT KNEE; THREE XRAY VIEWS OF THE RIGHT ANKLE 6/25/2021 5:16 pm COMPARISON: None. HISTORY: ORDERING SYSTEM PROVIDED HISTORY: Injury TECHNOLOGIST PROVIDED HISTORY: Reason for exam:->Injury Reason for Exam: Leg Pain (R knee ankle and foot, pt had a iron plate fall on it while helping a friend move) Acuity: Acute Type of Exam: Initial Mechanism of Injury: Leg Pain (R knee ankle and foot, pt had a iron plate fall on it while helping a friend move) FINDINGS: Right knee: Anterior soft tissue swelling is present. No abnormality otherwise. No fracture or dislocation. No joint effusion. No significant degenerative change. Right ankle: Mild remote fracture deformity of the distal fibula. No acute fracture. Moderate diffuse degenerative changes. Right foot: Mild diffuse degenerative changes. No fracture or dislocation or other acute abnormality. No acute abnormality of the right knee, right ankle or right foot. No fracture or dislocation.      XR ANKLE RIGHT (MIN 3 VIEWS)    Result Date: 6/25/2021  EXAMINATION: 3 XRAY VIEWS OF THE RIGHT FOOT; 2 XRAY VIEWS OF THE RIGHT KNEE; THREE XRAY VIEWS OF THE RIGHT ANKLE 6/25/2021 5:16 pm COMPARISON: None. HISTORY: ORDERING SYSTEM PROVIDED HISTORY: Injury TECHNOLOGIST PROVIDED HISTORY: Reason for exam:->Injury Reason for Exam: Leg Pain (R knee ankle and foot, pt had a iron plate fall on it while helping a friend move) Acuity: Acute Type of Exam: Initial Mechanism of Injury: Leg Pain (R knee ankle and foot, pt had a iron plate fall on it while helping a friend move) FINDINGS: Right knee: Anterior soft tissue swelling is present. No abnormality otherwise. No fracture or dislocation. No joint effusion. No significant degenerative change. Right ankle: Mild remote fracture deformity of the distal fibula. No acute fracture. Moderate diffuse degenerative changes. Right foot: Mild diffuse degenerative changes. No fracture or dislocation or other acute abnormality. No acute abnormality of the right knee, right ankle or right foot. No fracture or dislocation. XR FOOT RIGHT (MIN 3 VIEWS)    Result Date: 6/25/2021  EXAMINATION: 3 XRAY VIEWS OF THE RIGHT FOOT; 2 XRAY VIEWS OF THE RIGHT KNEE; THREE XRAY VIEWS OF THE RIGHT ANKLE 6/25/2021 5:16 pm COMPARISON: None. HISTORY: ORDERING SYSTEM PROVIDED HISTORY: Injury TECHNOLOGIST PROVIDED HISTORY: Reason for exam:->Injury Reason for Exam: Leg Pain (R knee ankle and foot, pt had a iron plate fall on it while helping a friend move) Acuity: Acute Type of Exam: Initial Mechanism of Injury: Leg Pain (R knee ankle and foot, pt had a iron plate fall on it while helping a friend move) FINDINGS: Right knee: Anterior soft tissue swelling is present. No abnormality otherwise. No fracture or dislocation. No joint effusion. No significant degenerative change. Right ankle: Mild remote fracture deformity of the distal fibula. No acute fracture. Moderate diffuse degenerative changes. Right foot: Mild diffuse degenerative changes. No fracture or dislocation or other acute abnormality. No acute abnormality of the right knee, right ankle or right foot. No fracture or dislocation. PROCEDURES   Unless otherwise noted below, none     Procedures    CRITICAL CARE TIME   N/A    CONSULTS:  None      EMERGENCY DEPARTMENT COURSE and DIFFERENTIAL DIAGNOSIS/MDM:   Vitals:    Vitals:    06/25/21 1650   BP: (!) 131/91   Pulse: 82   Resp: 18   Temp: 98.5 °F (36.9 °C)   TempSrc: Temporal   SpO2: 97%   Weight: 184 lb 15.5 oz (83.9 kg)   Height: 5' 10\" (1.778 m)       Patient was given the following medications:  Medications - No data to display        38-year old male presents for right leg pain following injury. Neurovascularly intact, compartments soft, no pain out of proportion; low suspicion for compartment syndrome. X-ray of right knee, ankle, foot without evidence of fracture or dislocation. Appropriate for discharge with outpatient follow up. Instructed to follow up with primary care provider, ideally to be seen within 1 week. Instructed to return for new or worsening symptoms including but not limited to worsening pain, numbness, weakness, any other symptoms he is concerned about. FINAL IMPRESSION      1. Contusion of right lower leg, initial encounter          DISPOSITION/PLAN   DISPOSITION Decision To Discharge 06/25/2021 06:00:32 PM      PATIENT REFERRED TO:  [facility] [address]  [phone]    Call in 1 day  Call to schedule primary care follow up, ideally to be seen within 1 week.       DISCHARGE MEDICATIONS:  Discharge Medication List as of 6/25/2021  6:11 PM      START taking these medications    Details   acetaminophen (TYLENOL) 500 MG tablet Take 2 tablets by mouth every 6 hours as needed for Pain, Disp-40 tablet, R-0Normal             DISCONTINUED MEDICATIONS:  Discharge Medication List as of 6/25/2021  6:11 PM      STOP taking these medications       bisacodyl (DULCOLAX) 5 MG EC tablet Comments:   Reason for Stopping:         SUMAtriptan Succinate Refill 6 MG/0.5ML SOCT Comments:   Reason for Stopping:         ibuprofen (IBU) 600 MG tablet Comments:   Reason for Stopping:         Diclofenac Sodium POWD Comments:   Reason for Stopping:         Multiple Vitamin (MULTI-VITAMIN DAILY PO) Comments:   Reason for Stopping:                      (Please note that portions of this note were completed with a voice recognition program.  Efforts were made to edit the dictations but occasionally words are mis-transcribed.)    LEXIS Coe (electronically signed)         LEXIS Coe  06/27/21 [id number]

## 2021-07-23 ENCOUNTER — OFFICE VISIT (OUTPATIENT)
Dept: FAMILY MEDICINE CLINIC | Age: 41
End: 2021-07-23
Payer: COMMERCIAL

## 2021-07-23 DIAGNOSIS — M72.2 PLANTAR FASCIITIS OF RIGHT FOOT: Primary | ICD-10-CM

## 2021-07-23 DIAGNOSIS — M50.30 DDD (DEGENERATIVE DISC DISEASE), CERVICAL: ICD-10-CM

## 2021-07-23 DIAGNOSIS — M79.18 MYOFASCIAL PAIN SYNDROME: ICD-10-CM

## 2021-07-23 PROCEDURE — 20550 NJX 1 TENDON SHEATH/LIGAMENT: CPT | Performed by: FAMILY MEDICINE

## 2021-07-23 PROCEDURE — 1036F TOBACCO NON-USER: CPT | Performed by: FAMILY MEDICINE

## 2021-07-23 PROCEDURE — 99213 OFFICE O/P EST LOW 20 MIN: CPT | Performed by: FAMILY MEDICINE

## 2021-07-23 PROCEDURE — G8419 CALC BMI OUT NRM PARAM NOF/U: HCPCS | Performed by: FAMILY MEDICINE

## 2021-07-23 PROCEDURE — G8427 DOCREV CUR MEDS BY ELIG CLIN: HCPCS | Performed by: FAMILY MEDICINE

## 2021-07-23 RX ORDER — METHYLPREDNISOLONE ACETATE 40 MG/ML
20 INJECTION, SUSPENSION INTRA-ARTICULAR; INTRALESIONAL; INTRAMUSCULAR; SOFT TISSUE ONCE
Status: COMPLETED | OUTPATIENT
Start: 2021-07-23 | End: 2021-07-23

## 2021-07-23 RX ORDER — TRAMADOL HYDROCHLORIDE 50 MG/1
50 TABLET ORAL EVERY 6 HOURS PRN
Qty: 20 TABLET | Refills: 0 | Status: SHIPPED | OUTPATIENT
Start: 2021-07-23 | End: 2021-08-20 | Stop reason: SDUPTHER

## 2021-07-23 RX ADMIN — METHYLPREDNISOLONE ACETATE 20 MG: 40 INJECTION, SUSPENSION INTRA-ARTICULAR; INTRALESIONAL; INTRAMUSCULAR; SOFT TISSUE at 17:29

## 2021-07-23 NOTE — PROGRESS NOTES
Subjective:      Patient ID: Mary Jennings is a 39 y.o. male. HPI  Chief Complaint   Patient presents with    Other     shot in foot      Sees [facility] pain mgmt - regularly for myofascial pain  Off and on for awhile - on right side  Cortisone has helped in past but gets steroid flare w/ meds  Had cervical surgery for ddd in past  Past Surgical History:   Procedure Laterality Date    HERNIA REPAIR Bilateral 05/08/2017    lap repair BIH, umb hernia    NECK SURGERY  2014    C2-C6 with screws and charles    TONSILLECTOMY      WISDOM TOOTH EXTRACTION       Had trigger point injections in right trap area but somewhat on left - no relief  ? About pain shot in neck. 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 Appearance: He is well-developed. Eyes:      General: No scleral icterus. Pulmonary:      Effort: Pulmonary effort is normal.   Musculoskeletal:      Comments: Tender medial mid calcaneus plantar surface w/o obvious inflammation right heel  No swelling  Some reduced rom neck   Skin:     General: Skin is warm and dry. Neurological:      Mental Status: He is alert and oriented to person, place, and time. Assessment / Plan:       Diagnosis Orders   1. Plantar fasciitis of right foot  traMADol (ULTRAM) 50 MG tablet   2. Myofascial pain syndrome     3.  DDD (degenerative disc disease), cervical       D/w pt f/u w/ pain mgmt regarding possible cervical injections  D/w pt discussing injections w/ pain mgmt  Cont diclofenac - muscle relaxants not helping nor gabapentin  Will give tramadol to take w/ tylenol prn pain - #20  oarrs reviewed and results c/w rx'd meds  Hx of steroid flare - dw pt this as a side effect  20mg depomedrol w/ 1/2 cc marcaine injected medial right heel into point of maximal tenderness on heel - pt tolerated well  Cont heel insert/ pad w/ stretching stressed for tx of plantar fascia  F/u prn worsening/ persistent sxs

## 2021-08-20 ENCOUNTER — TELEPHONE (OUTPATIENT)
Dept: FAMILY MEDICINE CLINIC | Age: 41
End: 2021-08-20

## 2021-08-20 DIAGNOSIS — M72.2 PLANTAR FASCIITIS OF RIGHT FOOT: ICD-10-CM

## 2021-08-20 RX ORDER — TRAMADOL HYDROCHLORIDE 50 MG/1
50 TABLET ORAL EVERY 6 HOURS PRN
Qty: 20 TABLET | Refills: 0 | Status: SHIPPED | OUTPATIENT
Start: 2021-08-20 | End: 2021-08-25

## 2021-08-20 NOTE — TELEPHONE ENCOUNTER
Pt is calling to get a refill on his Tramadol 50 mg #20.     Please call [facility]

## 2021-09-09 ENCOUNTER — NURSE TRIAGE (OUTPATIENT)
Dept: OTHER | Facility: CLINIC | Age: 41
End: 2021-09-09

## 2021-09-09 NOTE — TELEPHONE ENCOUNTER
Received call from [id number] [place] [address] at [facility] with Red Flag Complaint. Brief description of triage: Pt reports left shoulder/deltoid pain x3 weeks. Worse with movement and exertion. Rates 9/10 with movement. Triage indicates for patient to have appt today or go to [facility] if no appts available. Care advice provided, patient verbalizes understanding; denies any other questions or concerns; instructed to call back for any new or worsening symptoms. Writer provided warm transfer to Viktoria Matias at [facility] for appointment scheduling. Attention Provider: Thank you for allowing me to participate in the care of your patient. The patient was connected to triage in response to information provided to the [facility]. Please do not respond through this encounter as the response is not directed to a shared pool. Reason for Disposition   SEVERE pain (e.g., excruciating, unable to do any normal activities)    Answer Assessment - Initial Assessment Questions  1. ONSET: \"When did the pain start? \"      3 weeks ago    2. LOCATION: \"Where is the pain located? \"      Left shoulder, left deltoid    3. PAIN: \"How bad is the pain? \" (Scale 1-10; or mild, moderate, severe)    - MILD (1-3): doesn't interfere with normal activities    - MODERATE (4-7): interferes with normal activities (e.g., work or school) or awakens from sleep    - SEVERE (8-10): excruciating pain, unable to do any normal activities, unable to move arm at all due to pain      Worse with movement and exertion at rest 2/10, raising arm 9/10    4. WORK OR EXERCISE: \"Has there been any recent work or exercise that involved this part of the body? \"      No recent injuries    5. CAUSE:\"What do you think is causing the shoulder pain? \"      Unknown    6. OTHER SYMPTOMS: \"Do you have any other symptoms? \" (e.g., neck pain, swelling, rash, fever, numbness, weakness)      Denies    7. PREGNANCY: \"Is there any chance you are pregnant? \" \"When was your last menstrual period? \"      N/A    Protocols used: SHOULDER PAIN-ADULT-[place]

## 2021-09-13 ENCOUNTER — OFFICE VISIT (OUTPATIENT)
Dept: FAMILY MEDICINE CLINIC | Age: 41
End: 2021-09-13
Payer: COMMERCIAL

## 2021-09-13 VITALS
SYSTOLIC BLOOD PRESSURE: 118 MMHG | DIASTOLIC BLOOD PRESSURE: 76 MMHG | BODY MASS INDEX: 26.92 KG/M2 | HEIGHT: 70 IN | WEIGHT: 188 LBS

## 2021-09-13 DIAGNOSIS — M25.512 ACUTE PAIN OF LEFT SHOULDER: Primary | ICD-10-CM

## 2021-09-13 PROCEDURE — 99213 OFFICE O/P EST LOW 20 MIN: CPT | Performed by: NURSE PRACTITIONER

## 2021-09-13 PROCEDURE — G8419 CALC BMI OUT NRM PARAM NOF/U: HCPCS | Performed by: NURSE PRACTITIONER

## 2021-09-13 PROCEDURE — 1036F TOBACCO NON-USER: CPT | Performed by: NURSE PRACTITIONER

## 2021-09-13 PROCEDURE — G8427 DOCREV CUR MEDS BY ELIG CLIN: HCPCS | Performed by: NURSE PRACTITIONER

## 2021-09-13 RX ORDER — PREDNISONE 10 MG/1
TABLET ORAL
Qty: 21 TABLET | Refills: 0 | Status: SHIPPED | OUTPATIENT
Start: 2021-09-13 | End: 2022-02-17 | Stop reason: ALTCHOICE

## 2021-09-13 SDOH — ECONOMIC STABILITY: FOOD INSECURITY: WITHIN THE PAST 12 MONTHS, YOU WORRIED THAT YOUR FOOD WOULD RUN OUT BEFORE YOU GOT MONEY TO BUY MORE.: NEVER TRUE

## 2021-09-13 SDOH — ECONOMIC STABILITY: TRANSPORTATION INSECURITY
IN THE PAST 12 MONTHS, HAS LACK OF TRANSPORTATION KEPT YOU FROM MEETINGS, WORK, OR FROM GETTING THINGS NEEDED FOR DAILY LIVING?: NO

## 2021-09-13 SDOH — ECONOMIC STABILITY: TRANSPORTATION INSECURITY
IN THE PAST 12 MONTHS, HAS THE LACK OF TRANSPORTATION KEPT YOU FROM MEDICAL APPOINTMENTS OR FROM GETTING MEDICATIONS?: NO

## 2021-09-13 SDOH — ECONOMIC STABILITY: FOOD INSECURITY: WITHIN THE PAST 12 MONTHS, THE FOOD YOU BOUGHT JUST DIDN'T LAST AND YOU DIDN'T HAVE MONEY TO GET MORE.: NEVER TRUE

## 2021-09-13 ASSESSMENT — PATIENT HEALTH QUESTIONNAIRE - PHQ9
SUM OF ALL RESPONSES TO PHQ QUESTIONS 1-9: 0
1. LITTLE INTEREST OR PLEASURE IN DOING THINGS: 0
2. FEELING DOWN, DEPRESSED OR HOPELESS: 0
SUM OF ALL RESPONSES TO PHQ9 QUESTIONS 1 & 2: 0

## 2021-09-13 ASSESSMENT — ENCOUNTER SYMPTOMS
COLOR CHANGE: 0
ABDOMINAL DISTENTION: 0
SHORTNESS OF BREATH: 0
CHEST TIGHTNESS: 0
NAUSEA: 0
BACK PAIN: 0
COUGH: 0
EYE DISCHARGE: 0
CONSTIPATION: 0
DIARRHEA: 0
SINUS PRESSURE: 0
ABDOMINAL PAIN: 0
SINUS PAIN: 0

## 2021-09-13 ASSESSMENT — SOCIAL DETERMINANTS OF HEALTH (SDOH): HOW HARD IS IT FOR YOU TO PAY FOR THE VERY BASICS LIKE FOOD, HOUSING, MEDICAL CARE, AND HEATING?: NOT HARD AT ALL

## 2021-09-13 NOTE — LETTER
[facility] [address] [place] [address]  Phone: [phone]  Fax: [address], APRN - CNP        September 13, 2021     Patient: Bhavna Duran   YOB: 1980   Date of Visit: 9/13/2021       To Whom It May Concern: It is my medical opinion that Kwasi Zurita may return to full participation immediately with no restrictions but does need to follow up with an orthopedic if pain persists despite treatment started today. If you have any questions or concerns, please don't hesitate to call.     Sincerely,            Sabrina Mcmanus, APRN - CNP

## 2021-09-13 NOTE — PATIENT INSTRUCTIONS
No NSAIDs (ibuprofen, motrin, aleve, advil, naproxen, etc) while on steroid     Patient Education        Shoulder Stretches: Exercises  Introduction  Here are some examples of exercises for you to try. The exercises may be suggested for a condition or for rehabilitation. Start each exercise slowly. Ease off the exercises if you start to have pain. You will be told when to start these exercises and which ones will work best for you. How to do the exercises  Shoulder stretch   1.  a doorway and place one arm against the door frame. Your elbow should be a little higher than your shoulder. 2. Relax your shoulders as you lean forward, allowing your chest and shoulder muscles to stretch. You can also turn your body slightly away from your arm to stretch the muscles even more. 3. Hold for 15 to 30 seconds. 4. Repeat 2 to 4 times with each arm. Shoulder and chest stretch   1. Shoulder and chest stretch  2. While sitting, relax your upper body so you slump slightly in your chair. 3. As you breathe in, straighten your back and open your arms out to the sides. 4. Gently pull your shoulder blades back and downward. 5. Hold for 15 to 30 seconds as your breathe normally. 6. Repeat 2 to 4 times. Overhead stretch   1. Reach up over your head with both arms. 2. Hold for 15 to 30 seconds. 3. Repeat 2 to 4 times. Follow-up care is a key part of your treatment and safety. Be sure to make and go to all appointments, and call your doctor if you are having problems. It's also a good idea to know your test results and keep a list of the medicines you take. Where can you learn more? Go to [email or web address] [email or web address] org and sign in to your Wenwo account. Enter S254 in the [facility] box to learn more about \"Shoulder Stretches: Exercises. \"     If you do not have an account, please click on the \"Sign Up Now\" link.   Current as of: November 16, 2020               Content Version: 12.9  © heating pad set on low or a warm cloth to your shoulder. This helps keep your shoulder flexible. Some doctors suggest that you go back and forth between hot and cold. Put a thin cloth between the heating pad and your skin. · Follow your doctor's or physical therapist's directions for exercises. · Return to your usual level of activity slowly. When should you call for help? Call your doctor now or seek immediate medical care if:    · Your pain is worse.     · You cannot move your shoulder.     · Your arm is cool or pale or changes color below the shoulder.     · You have tingling, weakness, or numbness in your arm. Watch closely for changes in your health, and be sure to contact your doctor if:    · You do not get better as expected. Where can you learn more? Go to https://Vaultus Mobile.[employer]. org and sign in to your Momspot account. Enter G584 in the [place] box to learn more about \"Shoulder Sprain: Care Instructions. \"     If you do not have an account, please click on the \"Sign Up Now\" link. Current as of: November 16, 2020               Content Version: 12.9  © 8241-9518 [employer]. Care instructions adapted under license by [facility] [place] ([facility]). If you have questions about a medical condition or this instruction, always ask your healthcare professional. [place] [address] any warranty or liability for your use of this information. Patient Education        Shoulder Arthritis: Exercises  Introduction  Here are some examples of exercises for you to try. The exercises may be suggested for a condition or for rehabilitation. Start each exercise slowly. Ease off the exercises if you start to have pain. You will be told when to start these exercises and which ones will work best for you. How to do the exercises  Shoulder flexion (lying down)   To make a wand for this exercise, use a piece of PVC pipe or a broom handle with the broom removed.  Make the wand about a foot wider than your shoulders. 1. Lie on your back, holding a wand with both hands. Your palms should face down as you hold the wand. 2. Keeping your elbows straight, slowly raise your arms over your head. Raise them until you feel a stretch in your shoulders, upper back, and chest.  3. Hold for 15 to 30 seconds. 4. Repeat 2 to 4 times. Shoulder rotation (lying down)   To make a wand for this exercise, use a piece of PVC pipe or a broom handle with the broom removed. Make the wand about a foot wider than your shoulders. 1. Lie on your back. Hold a wand with both hands with your elbows bent and palms up. 2. Keep your elbows close to your body, and move the wand across your body toward the sore arm. 3. Hold for 8 to 12 seconds. 4. Repeat 2 to 4 times. Shoulder internal rotation with towel   1. Hold a towel above and behind your head with the arm that is not sore. 2. With your sore arm, reach behind your back and grasp the towel. 3. With the arm above your head, pull the towel upward. Do this until you feel a stretch on the front and outside of your sore shoulder. 4. Hold 15 to 30 seconds. 5. Repeat 2 to 4 times. Shoulder blade squeeze   1. Stand with your arms at your sides, and squeeze your shoulder blades together. Do not raise your shoulders up as you squeeze. 2. Hold 6 seconds. 3. Repeat 8 to 12 times. Resisted rows   For this exercise, you will need elastic exercise material, such as surgical tubing or Thera-Band. 1. Put the band around a solid object at about waist level. (A bedpost will work well.) Each hand should hold an end of the band. 2. With your elbows at your sides and bent to 90 degrees, pull the band back. Your shoulder blades should move toward each other. Return to the starting position. 3. Repeat 8 to 12 times. External rotator strengthening exercise   1. Start by tying a piece of elastic exercise material to a doorknob.  You can use surgical tubing or Thera-Band. (You may also hold one end of the band in each hand.)  2. Stand or sit with your shoulder relaxed and your elbow bent 90 degrees. Your upper arm should rest comfortably against your side. Squeeze a rolled towel between your elbow and your body for comfort. This will help keep your arm at your side. 3. Hold one end of the elastic band with the hand of the painful arm. 4. Start with your forearm across your belly. Slowly rotate the forearm out away from your body. Keep your elbow and upper arm tucked against the towel roll or the side of your body until you begin to feel tightness in your shoulder. Slowly move your arm back to where you started. 5. Repeat 8 to 12 times. Internal rotator strengthening exercise   1. Start by tying a piece of elastic exercise material to a doorknob. You can use surgical tubing or Thera-Band. 2. Stand or sit with your shoulder relaxed and your elbow bent 90 degrees. Your upper arm should rest comfortably against your side. Squeeze a rolled towel between your elbow and your body for comfort. This will help keep your arm at your side. 3. Hold one end of the elastic band in the hand of the painful arm. 4. Slowly rotate your forearm toward your body until it touches your belly. Slowly move it back to where you started. 5. Keep your elbow and upper arm firmly tucked against the towel roll or at your side. 6. Repeat 8 to 12 times. Pendulum swing   If you have pain in your back, do not do this exercise. 1. Hold on to a table or the back of a chair with your good arm. Then bend forward a little and let your sore arm hang straight down. This exercise does not use the arm muscles. Rather, use your legs and your hips to create movement that makes your arm swing freely. 2. Use the movement from your hips and legs to guide the slightly swinging arm back and forth like a pendulum (or elephant trunk).  Then guide it in circles that start small (about the size of a dinner plate). Make the circles a bit larger each day, as your pain allows. 3. Do this exercise for 5 minutes, 5 to 7 times each day. 4. As you have less pain, try bending over a little farther to do this exercise. This will increase the amount of movement at your shoulder. Follow-up care is a key part of your treatment and safety. Be sure to make and go to all appointments, and call your doctor if you are having problems. It's also a good idea to know your test results and keep a list of the medicines you take. Where can you learn more? Go to https://Creative Artists Agencypepiceweb.Bgifty. org and sign in to your [email or web address] account. Enter H562 in the NetMovie box to learn more about \"Shoulder Arthritis: Exercises. \"     If you do not have an account, please click on the \"Sign Up Now\" link. Current as of: November 16, 2020               Content Version: 12.9  © 2006-2021 OKpanda. Care instructions adapted under license by [facility] ([facility]). If you have questions about a medical condition or this instruction, always ask your healthcare professional. [place] [address] any warranty or liability for your use of this information. Patient Education        Shoulder Blade: Exercises  Introduction  Here are some examples of exercises for you to try. The exercises may be suggested for a condition or for rehabilitation. Start each exercise slowly. Ease off the exercises if you start to have pain. You will be told when to start these exercises and which ones will work best for you. How to do the exercises  Shoulder roll   1. Stand tall with your chin slightly tucked. Imagine that a string at the top of your head is pulling you straight up. 2. Keep your arms relaxed. All motion will be in your shoulders. 3. Shrug your shoulders up toward your ears, then up and back. [place] your shoulders down and back, like you're sliding your hands down into your back pants pockets.   4. Repeat the circles at least 2 to 4 times. 5. This exercise is also helpful anytime you want to relax. Lower neck and upper back stretch   1. With your arms about shoulder height, clasp your hands in front of you. 2. Drop your chin toward your chest.  3. Reach straight forward so you are rounding your upper back. Think about pulling your shoulder blades apart. [place] feel a stretch across your upper back and shoulders. Hold for at least 6 seconds. 4. Repeat 2 to 4 times. Triceps stretch   1. Reach your arm straight up. 2. Keeping your elbow in place, bend your arm and reach your hand down behind your back. 3. With your other hand, apply gentle pressure to the bent elbow. [place] feel a stretch at the back of your upper arm and shoulder. Hold about 6 seconds. 4. Repeat 2 to 4 times with each arm. Shoulder stretch   1. Relax your shoulders. 2. Raise one arm to shoulder height, and reach it across your chest.  3. Pull the arm slightly toward you with your other arm. This will help you get a gentle stretch. Hold for about 6 seconds. 4. Repeat 2 to 4 times. Shoulder blade squeeze   1. Sit or stand up tall with your arms at your sides. 2. Keep your shoulders relaxed and down, not shrugged. 3. Squeeze your shoulder blades together. Hold for 6 seconds, then relax. 4. Repeat 8 to 12 times. Straight-arm shoulder blade squeeze   1. Sit or stand tall. Relax your shoulders. 2. With palms down, hold your elastic tubing or band straight out in front of you. 3. Start with slight tension in the tubing or band, with your hands about shoulder-width apart. 4. Slowly pull straight out to the sides, squeezing your shoulder blades together. Keep your arms straight and at shoulder height. Slowly release. 5. Repeat 8 to 12 times. Rowing   1. [place] your elastic tubing or band at about waist height. Take one end in each hand. 2. Sit or stand with your feet hip-width apart. 3. Hold your arms straight in front of you. Adjust your distance to create slight tension in the tubing or band. 4. Slightly tuck your chin. Relax your shoulders. 5. Without shrugging your shoulders, pull straight back. Your elbows will pass alongside your waist.    Pull-downs   1. [place] your elastic tubing or band in the top of a closed door. Take one end in each hand. 2. Either sit or stand, depending on what is more comfortable. If you feel unsteady, sit on a chair. 3. Start with your arms up and comfortably apart, elbows straight. There should be a slight tension in the tubing or band. 4. Slightly tuck your chin, and look straight ahead. 5. Keeping your back straight, slowly pull down and back, bending your elbows. 6. Stop where your hands are level with your chin, in a \"goalpost\" position. 7. Repeat 8 to 12 times. Chest T stretch   1. Lie on your back. Raise your knees so they are bent. Plant your feet on the floor, hip-width apart. 2. Tuck your chin, and relax your shoulders. 3. Reach your arms straight out to the sides. If you don't feel a mild stretch in your shoulders and across your chest, use a foam roll or a tightly rolled blanket under your spine, from your tailbone to your head. 4. Relax in this position for at least 15 to 30 seconds while you breathe normally. Repeat 2 to 4 times. 5. As you get used to this stretch, keep adding a little more time until you are able relax in this position for 2 or 3 minutes. When you can relax for at least 2 minutes, you only need to do the exercise 1 time per session. Chest goalpost stretch   1. Lie on your back. Raise your knees so they are bent. Plant your feet on the floor, hip-width apart. 2. Tuck your chin, and relax your shoulders. 3. Reach your arms straight out to the sides. 4. Bend your arms at the elbows, with your hands pointed toward the top of your head. Your arms should make an L on either side of your head. Your palms should be facing up.   5. If you don't feel a mild stretch in your shoulders and across your chest, use a foam roll or tightly rolled blanket under your spine, from your tailbone to your head. 6. Relax in this position for at least 15 to 30 seconds while you breathe normally. Repeat 2 to 4 times. 7. Each day you do this exercise, add a little more time until you can relax in this position for 2 or 3 minutes. When you can relax for at least 2 minutes, you only need to do the exercise 1 time per session. Follow-up care is a key part of your treatment and safety. Be sure to make and go to all appointments, and call your doctor if you are having problems. It's also a good idea to know your test results and keep a list of the medicines you take. Where can you learn more? Go to https://Always Prepped.[employer]. org and sign in to your Sicubo account. Enter [phone] in the [employer] box to learn more about \"Shoulder Blade: Exercises. \"     If you do not have an account, please click on the \"Sign Up Now\" link. Current as of: November 16, 2020               Content Version: 12.9  © 2006-2021 [employer]ru. Care instructions adapted under license by [facility] ([facility]). If you have questions about a medical condition or this instruction, always ask your healthcare professional. [address] any warranty or liability for your use of this information. Patient Education        Shoulder Pain: Care Instructions  Your Care Instructions     You can hurt your shoulder by using it too much during an activity, such as fishing or baseball. It can also happen as part of the everyday wear and tear of getting older. Shoulder injuries can be slow to heal, but your shoulder should get better with time. Your doctor may recommend a sling to rest your shoulder. If you have injured your shoulder, you may need testing and treatment. Follow-up care is a key part of your treatment and safety.  Be sure to make and go to all appointments, and call your doctor if you are having problems. It's also a good idea to know your test results and keep a list of the medicines you take. How can you care for yourself at home? · Take pain medicines exactly as directed. ? If the doctor gave you a prescription medicine for pain, take it as prescribed. ? If you are not taking a prescription pain medicine, ask your doctor if you can take an over-the-counter medicine. ? Do not take two or more pain medicines at the same time unless the doctor told you to. Many pain medicines contain acetaminophen, which is Tylenol. Too much acetaminophen (Tylenol) can be harmful. · If your doctor recommends that you wear a sling, use it as directed. Do not take it off before your doctor tells you to. · Put ice or a cold pack on the sore area for 10 to 20 minutes at a time. Put a thin cloth between the ice and your skin. · If there is no swelling, you can put moist heat, a heating pad, or a warm cloth on your shoulder. Some doctors suggest alternating between hot and cold. · Rest your shoulder for a few days. If your doctor recommends it, you can then begin gentle exercise of the shoulder, but do not lift anything heavy. When should you call for help? Call 911 anytime you think you may need emergency care. For example, call if:    · You have chest pain or pressure. This may occur with:  ? Sweating. ? Shortness of breath. ? Nausea or vomiting. ? Pain that spreads from the chest to the neck, jaw, or one or both shoulders or arms. ? Dizziness or lightheadedness. ? A fast or uneven pulse. After calling 911, chew 1 adult-strength aspirin. Wait for an ambulance. Do not try to drive yourself.     · Your arm or hand is cool or pale or changes color. Call your doctor now or seek immediate medical care if:    · You have signs of infection, such as:  ? Increased pain, swelling, warmth, or redness in your shoulder. ? Red streaks leading from a place on your shoulder.   ? Pus draining

## 2021-09-13 NOTE — PROGRESS NOTES
Date of Service:  2021    Bhavna Duran (:  1980) is a 39 y.o. male, here for evaluation of the following medical concerns:    Chief Complaint   Patient presents with    Pain     LEFT SHOULDER PAIN STABBING PAIN X3 WEEKS HARD TO LIFT ANYTHING HAPPENED POSS AT [address] ROM         HPI     Shoulder Pain  Patient complains of inferior, left side, anterior shoulder pain. The symptoms began several weeks ago Symptoms have gradually worsened. Pain is described as repetitive movements, overhead movements and lying on the shoulder. Symptoms were incited by no known event and thinks it could have been an injury lifting weights at the gym. Patient denies NONE. Therapy to date includes rest, ice, avoidance of offending activity, OTC analgesics which is ineffective, home exercises which are ineffective and gabapentin 600 mg from C2-C6 surgery. No injuries of left shoulder in past. Last steroids around . Tramadol last filled 20 tabs  from Dr Brandon Guerrier. Review of Systems   Constitutional: Negative for activity change, appetite change, fatigue, fever and unexpected weight change. HENT: Negative for congestion, ear pain, sinus pressure and sinus pain. Eyes: Negative for discharge and visual disturbance. Respiratory: Negative for cough, chest tightness and shortness of breath. Cardiovascular: Negative for chest pain, palpitations and leg swelling. Gastrointestinal: Negative for abdominal distention, abdominal pain, constipation, diarrhea and nausea. Endocrine: Negative for cold intolerance, heat intolerance, polydipsia, polyphagia and polyuria. Genitourinary: Negative for decreased urine volume, difficulty urinating, dysuria, flank pain, frequency and urgency. Musculoskeletal: Positive for arthralgias (left shoulder pain). Negative for back pain, gait problem, joint swelling, myalgias and neck pain.    Skin: Negative for color change, rash and wound. Allergic/Immunologic: Negative for food allergies and immunocompromised state. Neurological: Negative for dizziness, tremors, speech difficulty, weakness, light-headedness, numbness and headaches. Hematological: Negative for adenopathy. Does not bruise/bleed easily. Psychiatric/Behavioral: Negative for confusion, decreased concentration, self-injury, sleep disturbance and suicidal ideas. The patient is not nervous/anxious. Prior to Visit Medications    Medication Sig Taking? Authorizing Provider   predniSONE (DELTASONE) 10 MG tablet Take 6 tabs day 1, 5 tabs day 2, 4 tabs day 3, 3 tabs day 4, 2 tabs day 5, 1 tab day 6 Yes Kari Poag, APRN - CNP   diclofenac sodium (VOLTAREN) 1 % GEL Apply 2-4 g topically 4 times daily as needed for Pain Yes Kari Poag, APRN - CNP   gabapentin (NEURONTIN) 300 MG capsule  Yes Historical Provider, MD   diclofenac (VOLTAREN) 75 MG EC tablet Take 75 mg by mouth 2 times daily Yes Historical Provider, MD   acetaminophen (TYLENOL) 500 MG tablet Take 2 tablets by mouth every 6 hours as needed for Pain Yes LEXIS Roa   SUMAtriptan (IMITREX) 100 MG tablet Take 0.5 tablets by mouth once as needed for Migraine Yes Javan Dumont MD        Social History     Tobacco Use    Smoking status: Former Smoker     Packs/day: 0.00     Quit date: 10/28/2015     Years since quittin.8    Smokeless tobacco: Never Used   Substance Use Topics    Alcohol use: No     Comment: SOCIALLY- VERY RARE         Vitals:    21 1045   BP: 118/76   Site: Left Upper Arm   Position: Sitting   Cuff Size: Medium Adult   Weight: 188 lb (85.3 kg)   Height: 5' 10\" (1.778 m)     Estimated body mass index is 26.98 kg/m² as calculated from the following:    Height as of this encounter: 5' 10\" (1.778 m). Weight as of this encounter: 188 lb (85.3 kg). Physical Exam  Vitals reviewed. Constitutional:       General: He is awake. Appearance: Normal appearance. He is well-developed, well-groomed and overweight. He is not ill-appearing or toxic-appearing. HENT:      Head: Normocephalic and atraumatic. Right Ear: Hearing, tympanic membrane, ear canal and external ear normal.      Left Ear: Hearing, tympanic membrane, ear canal and external ear normal.      Nose: Nose normal.      Mouth/Throat:      Lips: Pink. Mouth: Mucous membranes are moist.      Pharynx: Oropharynx is clear. Eyes:      General: Lids are normal.      Extraocular Movements: Extraocular movements intact. Conjunctiva/sclera: Conjunctivae normal.      Pupils: Pupils are equal, round, and reactive to light. Neck:      Thyroid: No thyromegaly. Vascular: No carotid bruit. Cardiovascular:      Rate and Rhythm: Normal rate. Pulses: Normal pulses. Carotid pulses are 2+ on the right side and 2+ on the left side. Radial pulses are 2+ on the right side and 2+ on the left side. Posterior tibial pulses are 2+ on the right side and 2+ on the left side. Heart sounds: Normal heart sounds, S1 normal and S2 normal. Heart sounds not distant. No murmur heard. Pulmonary:      Effort: Pulmonary effort is normal.      Breath sounds: Normal breath sounds. Abdominal:      General: Bowel sounds are normal. There is no abdominal bruit. Palpations: Abdomen is soft. Tenderness: There is no abdominal tenderness. Genitourinary:     Comments: Deferred  Musculoskeletal:      Right shoulder: Normal.      Left shoulder: Swelling, tenderness and bony tenderness present. No deformity, effusion, laceration or crepitus. Decreased range of motion. Decreased strength. Normal pulse. Cervical back: Full passive range of motion without pain, normal range of motion and neck supple. Right lower leg: No edema. Left lower leg: No edema.    Lymphadenopathy:      Head:      Right side of head: No submental, submandibular, tonsillar, preauricular, posterior auricular or occipital adenopathy. Left side of head: No submental, submandibular, tonsillar, preauricular, posterior auricular or occipital adenopathy. Cervical: No cervical adenopathy. Right cervical: No superficial, deep or posterior cervical adenopathy. Left cervical: No superficial, deep or posterior cervical adenopathy. Upper Body:      Right upper body: No supraclavicular adenopathy. Left upper body: No supraclavicular adenopathy. Skin:     General: Skin is warm and dry. Capillary Refill: Capillary refill takes less than 2 seconds. Neurological:      General: No focal deficit present. Mental Status: He is alert and oriented to person, place, and time. Mental status is at baseline. Motor: Motor function is intact. No weakness. Coordination: Coordination is intact. Gait: Gait is intact. Psychiatric:         Attention and Perception: Attention and perception normal.         Mood and Affect: Mood and affect normal.         Speech: Speech normal.         Behavior: Behavior normal. Behavior is cooperative. Thought Content: Thought content normal.         Cognition and Memory: Cognition and memory normal.         Judgment: Judgment normal.         ASSESSMENT/PLAN:  1. Acute pain of left shoulder  -     predniSONE (DELTASONE) 10 MG tablet; Take 6 tabs day 1, 5 tabs day 2, 4 tabs day 3, 3 tabs day 4, 2 tabs day 5, 1 tab day 6, Disp-21 tablet, R-0Normal  -     diclofenac sodium (VOLTAREN) 1 % GEL;  Apply 2-4 g topically 4 times daily as needed for Pain, Topical, 4 TIMES DAILY PRN Starting Mon 9/13/2021, Disp-150 g, R-1, Normal  -     Ambulatory referral to Orthopedic Surgery    Ice 15 min/heat 15 min at least twice daily   Information printed and reviewed   Shoulder stretches   No NSAIDs (ibuprofen, motrin, aleve, advil, naproxen, etc) while on steroid       Care Gaps Addressed  Hep C screen recommended with next blood draw Chicken pox as child  HIV screen not needed  Diabetes screen recommended- last bloodwork was not fasting  Flu vaccine recommended         I have reviewed patient's pertinent medical history, relevant laboratory and imaging studies, and past/future health maintenance. Discussed with the patient the importance of adhering to their current medication regimen as directed. Advised the patient that they should continue to work on eating a healthy balanced diet and staying active by exercising within their personal limits. Orders as listed above. Patient was advised to keep future appointments with their respective specialty care team(s). Patient had the opportunity to ask questions, all of which were answered to the best of my ability and with patient satisfaction. Patient understands and is agreeable with the care plan following today's visit. Patient is to schedule an appointment for any new or worsening symptoms. Go to ER for significant shortness of breath, chest pain, or uncontrolled pain or fever. I discussed with patient the risk and benefits of any medications that were prescribed today. I verified that the patient understands their medications, labs, and/or procedures. The patient is doing well with current medication regimen and does not have any barriers to adherence. The patient's self-management abilities are good. Return in about 3 months (around 12/13/2021) for Physical Exam and Labs. An electronic signature was used to authenticate this note.     --LES Brown - CNP on 9/13/2021 at 11:08 AM

## 2021-09-21 ENCOUNTER — OFFICE VISIT (OUTPATIENT)
Dept: ORTHOPEDIC SURGERY | Age: 41
End: 2021-09-21
Payer: COMMERCIAL

## 2021-09-21 VITALS — BODY MASS INDEX: 26.77 KG/M2 | WEIGHT: 187 LBS | HEIGHT: 70 IN

## 2021-09-21 DIAGNOSIS — G89.29 CHRONIC LEFT SHOULDER PAIN: ICD-10-CM

## 2021-09-21 DIAGNOSIS — M25.512 CHRONIC LEFT SHOULDER PAIN: ICD-10-CM

## 2021-09-21 DIAGNOSIS — M75.82 ROTATOR CUFF TENDONITIS, LEFT: Primary | ICD-10-CM

## 2021-09-21 DIAGNOSIS — M19.012 ARTHRITIS OF LEFT ACROMIOCLAVICULAR JOINT: ICD-10-CM

## 2021-09-21 PROCEDURE — G8427 DOCREV CUR MEDS BY ELIG CLIN: HCPCS | Performed by: ORTHOPAEDIC SURGERY

## 2021-09-21 PROCEDURE — 1036F TOBACCO NON-USER: CPT | Performed by: ORTHOPAEDIC SURGERY

## 2021-09-21 PROCEDURE — 20610 DRAIN/INJ JOINT/BURSA W/O US: CPT | Performed by: ORTHOPAEDIC SURGERY

## 2021-09-21 PROCEDURE — G8419 CALC BMI OUT NRM PARAM NOF/U: HCPCS | Performed by: ORTHOPAEDIC SURGERY

## 2021-09-21 RX ORDER — MELOXICAM 15 MG/1
15 TABLET ORAL DAILY PRN
Qty: 30 TABLET | Refills: 1 | OUTPATIENT
Start: 2021-09-21 | End: 2021-11-10

## 2021-09-21 RX ORDER — BUPIVACAINE HYDROCHLORIDE 2.5 MG/ML
3 INJECTION, SOLUTION INFILTRATION; PERINEURAL ONCE
Status: COMPLETED | OUTPATIENT
Start: 2021-09-21 | End: 2021-09-21

## 2021-09-21 RX ORDER — METHYLPREDNISOLONE ACETATE 40 MG/ML
80 INJECTION, SUSPENSION INTRA-ARTICULAR; INTRALESIONAL; INTRAMUSCULAR; SOFT TISSUE ONCE
Status: COMPLETED | OUTPATIENT
Start: 2021-09-21 | End: 2021-09-21

## 2021-09-21 RX ORDER — IBUPROFEN 200 MG
200 TABLET ORAL EVERY 6 HOURS PRN
COMMUNITY
End: 2021-11-10

## 2021-09-21 RX ORDER — NAPROXEN SODIUM 220 MG
220 TABLET ORAL 2 TIMES DAILY WITH MEALS
COMMUNITY
End: 2021-11-10

## 2021-09-21 RX ADMIN — METHYLPREDNISOLONE ACETATE 80 MG: 40 INJECTION, SUSPENSION INTRA-ARTICULAR; INTRALESIONAL; INTRAMUSCULAR; SOFT TISSUE at 08:45

## 2021-09-21 RX ADMIN — BUPIVACAINE HYDROCHLORIDE 7.5 MG: 2.5 INJECTION, SOLUTION INFILTRATION; PERINEURAL at 08:45

## 2021-09-21 NOTE — PROGRESS NOTES
Boby Medellin  [id number]  September 21, 2021    Chief Complaint   Patient presents with    Shoulder Pain     Left shoulder       History: The patient is a 70-year-old gentleman who is here for evaluation of his left shoulder. The patient has had left shoulder pain for approximately 4 weeks. He cannot recall a specific injury. He does do a great deal of weight lifting and he feels as if he injured it in the gym. The patient has an extensive history with regards to his neck. He had C 2 to C 6 posterior cervical fusion back in 2014. He reports a relatively uneventful recovery. 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 The patient reports left shoulder pain with overhead activity. He does have difficulty sleeping on his left side. The patient's  past medical history, medications, allergies,  family history, social history, and have been reviewed, and dated and are recorded in the chart. Pertinent items are noted in HPI. Review of systems reviewed from Pertinent History Form dated on 9/21/21 and available in the posterior cervical fusion back in 2014. Chart under the Media tab. Vitals:  Ht 5' 10\" (1.778 m)   Wt 187 lb (84.8 kg)   BMI 26.83 kg/m²     Physical: Physical: The patient presents today in no acute distress. He is alert and oriented to person, place and time. He displays appropriate mood and affect. He is well dressed, nourished and  groomed. He has a normal affect. Examination of the neck reveals no evidence of tenderness. Spurling's sign is negative. Active range of motion of the left shoulder is: 175 degrees abduction, 175 degrees forward flexion, 45 degrees of external rotation and internal rotation to L1. Passive range of motion of the left shoulder is: 180 degrees abduction, 180 degrees forward flexion, 50 degrees of external rotation and internal rotation to T11.  Active and passive range of motion of the opposite shoulder is full. Examination of the left shoulder reveals positive Neer and Barclay' impingement signs. There is mild subacromial crepitus with range of motion. Drop arm test is negative bilaterally. Speed's test is negative. There is no evidence of tenderness over the Bicipital groove. He  does have tenderness over the [facility] joint. Cross body adduction test is positive. He has moderate tenderness over the subacromial space. There is no evidence of scapular winging. Lift off sign is negative. There is no evidence of atrophy. External rotation strength is full. There are no skin lesions, cellulitis, or extreme edema in the upper extremities. Sensation is intact to axillary, median, radial, and ulnar nerves bilaterally. The patient has warm and well-perfused bilateral upper extremities with brisk capillary refill. Radial and ulnar pulses are palpable and 2+ bilaterally. X-rays: 4 views of the left shoulder obtained in the office today were extensively reviewed. There is evidence of a type II-III acromion. There is moderate AC joint arthritis. There are no lytic or blastic lesions within the bone. Impression: #1 left shoulder rotator cuff tendinitis #2 left shoulder AC joint arthritis    Plan: At this time we will inject the left shoulder under sterile conditions. After a Betadine prep of the shoulder, 3cc of 0.25% Marcaine and 2cc of 40 mg Depo-Medrol were injected into the shoulder. The patient tolerated the injection rather well. The patient was instructed to follow up immediately for any signs of infection. The patient will follow up with me in 6 week(s). The treatment plan was discussed in detail with the patient and includes activity modification and avoidance of repetitive overhead activities. Home exercises were explained to the patient. A formal therapy program was discussed with the patient and was not indicated.  If the patient continues to have severe pain and weakness, we will consider other options.    The patient was given a prescription for meloxicam.

## 2021-09-21 NOTE — LETTER
[facility]  [address] [address]  Phone: [phone]  Fax: [phone]    Terri Brush MD        September 21, 2021     Patient: Arlette Cadena   YOB: 1980   Date of Visit: 9/21/2021       To Whom it May Concern:    Elizabeth Pollard was seen in my clinic on 9/21/2021. If you have any questions or concerns, please don't hesitate to call.     Sincerely,           Terri Brush MD

## 2021-10-20 ENCOUNTER — OFFICE VISIT (OUTPATIENT)
Dept: ORTHOPEDIC SURGERY | Age: 41
End: 2021-10-20
Payer: COMMERCIAL

## 2021-10-20 ENCOUNTER — TELEPHONE (OUTPATIENT)
Dept: ORTHOPEDIC SURGERY | Age: 41
End: 2021-10-20

## 2021-10-20 VITALS — WEIGHT: 187 LBS | BODY MASS INDEX: 26.77 KG/M2 | HEIGHT: 70 IN

## 2021-10-20 DIAGNOSIS — M19.012 ARTHRITIS OF LEFT ACROMIOCLAVICULAR JOINT: ICD-10-CM

## 2021-10-20 DIAGNOSIS — M75.82 ROTATOR CUFF TENDONITIS, LEFT: Primary | ICD-10-CM

## 2021-10-20 PROCEDURE — G8484 FLU IMMUNIZE NO ADMIN: HCPCS | Performed by: ORTHOPAEDIC SURGERY

## 2021-10-20 PROCEDURE — 1036F TOBACCO NON-USER: CPT | Performed by: ORTHOPAEDIC SURGERY

## 2021-10-20 PROCEDURE — 99213 OFFICE O/P EST LOW 20 MIN: CPT | Performed by: ORTHOPAEDIC SURGERY

## 2021-10-20 PROCEDURE — G8427 DOCREV CUR MEDS BY ELIG CLIN: HCPCS | Performed by: ORTHOPAEDIC SURGERY

## 2021-10-20 PROCEDURE — G8419 CALC BMI OUT NRM PARAM NOF/U: HCPCS | Performed by: ORTHOPAEDIC SURGERY

## 2021-10-20 RX ORDER — TRAMADOL HYDROCHLORIDE 50 MG/1
50 TABLET ORAL EVERY 8 HOURS PRN
Qty: 21 TABLET | Refills: 0 | Status: SHIPPED | OUTPATIENT
Start: 2021-10-20 | End: 2021-11-05 | Stop reason: SDUPTHER

## 2021-10-20 NOTE — TELEPHONE ENCOUNTER
I called patient 3 times today and every time he will say hello and the call will disconnect. I called again and he answered he said he had bad connection at work. I was giving him the number to call [facility] because MRI of left shoulder is approved now  but the call was disconnected again. I will wait until he calls back to give him the number.      Patient is scheduled for MRI on 11/12/21

## 2021-10-20 NOTE — PROGRESS NOTES
180 degrees abduction, 180 degrees forward flexion, 50 degrees of external rotation and internal rotation to T11. Active and passive range of motion of the opposite shoulder is full. Examination of the left shoulder reveals positive Neer and Barclay' impingement signs. There is mild subacromial crepitus with range of motion. Drop arm test is slightly positive on the left. Speed's test is negative. There is moderate tenderness over the Bicipital groove. He  does have tenderness over the [facility] joint. Cross body adduction test is positive. He has moderate tenderness over the subacromial space. There is no evidence of scapular winging. Lift off sign is negative. There is no evidence of atrophy. External rotation strength is full. There are no skin lesions, cellulitis, or extreme edema in the upper extremities. Sensation is intact to axillary, median, radial, and ulnar nerves bilaterally. The patient has warm and well-perfused bilateral upper extremities with brisk capillary refill. Radial and ulnar pulses are palpable and 2+ bilaterally. X-rays: 4 views of the left shoulder obtained in the office today were extensively reviewed. There is evidence of a type II-III acromion. There is moderate AC joint arthritis. There are no lytic or blastic lesions within the bone. Impression: #1 left shoulder rotator cuff tendinitis #2 left shoulder AC joint arthritis    Plan: At this time, we will continue with conservative treatment. He will continue his home exercises. He may continue taking the meloxicam.  We will obtain an MRI scan of the left shoulder. I am concerned about a occult tear of the rotator cuff versus biceps tendon. He will follow-up with me after the MRI scan is completed.

## 2021-11-10 ENCOUNTER — HOSPITAL ENCOUNTER (EMERGENCY)
Age: 41
Discharge: HOME | End: 2021-11-10
Payer: COMMERCIAL

## 2021-11-10 ENCOUNTER — APPOINTMENT (OUTPATIENT)
Dept: GENERAL RADIOLOGY | Age: 41
End: 2021-11-10
Payer: COMMERCIAL

## 2021-11-10 VITALS
HEART RATE: 77 BPM | RESPIRATION RATE: 18 BRPM | OXYGEN SATURATION: 96 % | TEMPERATURE: 98.5 F | SYSTOLIC BLOOD PRESSURE: 118 MMHG | DIASTOLIC BLOOD PRESSURE: 79 MMHG

## 2021-11-10 DIAGNOSIS — S90.32XA CONTUSION OF LEFT FOOT, INITIAL ENCOUNTER: Primary | ICD-10-CM

## 2021-11-10 PROCEDURE — 99284 EMERGENCY DEPT VISIT MOD MDM: CPT

## 2021-11-10 PROCEDURE — 73630 X-RAY EXAM OF FOOT: CPT

## 2021-11-10 PROCEDURE — 6370000000 HC RX 637 (ALT 250 FOR IP): Performed by: PHYSICIAN ASSISTANT

## 2021-11-10 RX ORDER — IBUPROFEN 400 MG/1
800 TABLET ORAL ONCE
Status: COMPLETED | OUTPATIENT
Start: 2021-11-10 | End: 2021-11-10

## 2021-11-10 RX ADMIN — IBUPROFEN 800 MG: 400 TABLET, FILM COATED ORAL at 16:45

## 2021-11-10 ASSESSMENT — PAIN SCALES - WONG BAKER: WONGBAKER_NUMERICALRESPONSE: 10

## 2021-11-10 ASSESSMENT — PAIN DESCRIPTION - LOCATION: LOCATION: FOOT

## 2021-11-10 ASSESSMENT — PAIN DESCRIPTION - ONSET: ONSET: SUDDEN

## 2021-11-10 ASSESSMENT — PAIN SCALES - GENERAL
PAINLEVEL_OUTOF10: 10
PAINLEVEL_OUTOF10: 8
PAINLEVEL_OUTOF10: 10

## 2021-11-10 ASSESSMENT — PAIN - FUNCTIONAL ASSESSMENT: PAIN_FUNCTIONAL_ASSESSMENT: 0-10

## 2021-11-10 ASSESSMENT — PAIN DESCRIPTION - PAIN TYPE: TYPE: ACUTE PAIN

## 2021-11-10 ASSESSMENT — PAIN DESCRIPTION - ORIENTATION: ORIENTATION: LEFT

## 2021-11-10 NOTE — Clinical Note
Nhung Morales was seen and treated in our emergency department on 11/10/2021. He may return to work on 11/13/2021. Unless unexpectedly early recovery allows early return to work     If you have any questions or concerns, please don't hesitate to call.       Oneal Shah PA-C

## 2021-11-10 NOTE — ED NOTES
Post op shoe applied to patient's left foot. Pt verbalized proper fit and proper use of shoe.      Dane Heredia RN  11/10/21 2873

## 2021-11-10 NOTE — ED PROVIDER NOTES
**ADVANCED PRACTICE PROVIDER, I HAVE EVALUATED THIS PATIENT**        [address] [facility] ENCOUNTER      Pt Name: Peter [id number]  [place] 1980  Date of evaluation: 11/10/2021  Provider: Albert Pizarro PA-C      Chief Complaint:    Chief Complaint   Patient presents with    Foot Injury     left-100lb piece of metal fell on foor at work         Nursing Notes, Past Medical Hx, Past Surgical Hx, Social Hx, Allergies, and Family Hx were all reviewed and agreed with or any disagreements were addressed in the HPI.    HPI: (Location, Duration, Timing, Severity, Quality, Assoc Sx, Context, Modifying factors)    Chief Complaint of left midfoot pain    This is a  39 y.o. male who presents stating that there was an accident at work where a piece of heavy metal was dropped on the patient's left foot. He states this happened within the last couple of hours before presenting. He had boots on at the time. He states that he does have a scrape across the upper foot but it hurts a lot when he puts pain on the foot and better at rest.  No acute radiating pain. He denies any heel pain or ankle pain knee pain or hip pain. No medication taken for this so far. Positive for severe constant local nonradiating pain as above with an abrasion. No toenail injury or other acute complaint.     PastMedical/Surgical History:      Diagnosis Date    Cervical radiculopathy 2/21/2019    C5-6 Per [facility]    Cervical stenosis of spine     Cluster headaches     Migraine     Neck pain     bulding discs and cervical stenosis    Stomach ulcer          Procedure Laterality Date    HERNIA REPAIR Bilateral 05/08/2017    lap repair BIH, umb hernia    NECK SURGERY  2014    C2-C6 with screws and charles    TONSILLECTOMY      WISDOM TOOTH EXTRACTION         Medications:  Previous Medications    ACETAMINOPHEN (TYLENOL) 500 MG TABLET    Take 2 tablets by mouth every 6 hours as needed for Pain    DICLOFENAC SODIUM (VOLTAREN) 1 % GEL    Apply 2-4 g topically 4 times daily as needed for Pain    GABAPENTIN (NEURONTIN) 300 MG CAPSULE        PREDNISONE (DELTASONE) 10 MG TABLET    Take 6 tabs day 1, 5 tabs day 2, 4 tabs day 3, 3 tabs day 4, 2 tabs day 5, 1 tab day 6    SUMATRIPTAN (IMITREX) 100 MG TABLET    Take 0.5 tablets by mouth once as needed for Migraine    TENS UNIT MISC    by Does not apply route    TRAMADOL (ULTRAM) 50 MG TABLET    Take 1 tablet by mouth every 8 hours as needed for Pain for up to 7 days. Review of Systems:  (2-9 systems needed)  Review of Systems  No acute twisting injury. Positive for history as above, no headache vision change neck pain or stiffness, no difficulty breathing or swallowing or abdominal pain or vomiting. No obvious acute deformity however positive local tenderness and abrasion and tenderness worse with walking in the midfoot left and better at rest.  \"Positives and Pertinent negatives as per HPI\"    Physical Exam:  Physical Exam  Vitals and nursing note reviewed. Constitutional:       Appearance: Normal appearance. He is not diaphoretic. HENT:      Head: Normocephalic and atraumatic. Right Ear: External ear normal.      Left Ear: External ear normal.      Nose: Nose normal.   Eyes:      General:         Right eye: No discharge. Left eye: No discharge. Conjunctiva/sclera: Conjunctivae normal.   Pulmonary:      Effort: Pulmonary effort is normal. No respiratory distress. Musculoskeletal:         General: Tenderness and signs of injury present. No swelling or deformity. Cervical back: Normal range of motion and neck supple. Right lower leg: No edema. Left lower leg: No edema. Comments: Distal pulses 2+ bilaterally in dorsalis pedis. Capillary fill brisk less than 1 second throughout. Left midfoot does have an abrasion across it and some localizing tenderness.   No obvious deformity and no plantar surface bruising. No ankle or heel pain based on exam.  Two-point discrimination intact throughout. Skin:     General: Skin is warm and dry. Capillary Refill: Capillary refill takes less than 2 seconds. Neurological:      Mental Status: He is alert and oriented to person, place, and time. Mental status is at baseline. Psychiatric:         Mood and Affect: Mood normal.         Behavior: Behavior normal.         MEDICAL DECISION MAKING    Vitals:    Vitals:    11/10/21 1516   BP: 118/79   Pulse: 77   Resp: 18   Temp: 98.5 °F (36.9 °C)   TempSrc: Oral   SpO2: 96%       LABS:Labs Reviewed - No data to display       RADIOLOGY:   Non-plain film images such as CT, Ultrasound and MRI are read by the radiologist. Jj Figueroa PA-C have directly visualized the radiologic plain film image(s) with the below findings:      Interpretation per the Radiologist below, if available at the time of this note:    XR FOOT LEFT (MIN 3 VIEWS)   Final Result   No acute osseous abnormality. XR FOOT LEFT (MIN 3 VIEWS)    Result Date: 11/10/2021  EXAMINATION: THREE XRAY VIEWS OF THE LEFT FOOT 11/10/2021 3:27 pm COMPARISON: 03/04/2011 HISTORY: ORDERING SYSTEM PROVIDED HISTORY: injury TECHNOLOGIST PROVIDED HISTORY: Reason for exam:->injury Reason for Exam: foot injury, pain in the first toe and metatarsal Acuity: Acute Type of Exam: Initial FINDINGS: There is no evidence of acute fracture. There is normal alignment of the tarsometatarsal joints. No acute joint abnormality. No focal osseous lesion. No focal soft tissue abnormality. No acute osseous abnormality. MEDICAL DECISION MAKING / ED COURSE:      PROCEDURES:   Procedures    None    Patient was given:  Medications   ibuprofen (ADVIL;MOTRIN) tablet 800 mg (has no administration in time range)     This patient presents as above and evaluation and treatment is begun. Ice pack placed and x-rays obtained. These return negative as above.   It does appear the patient has an abrasion which is now well bandaged by the PA with Vaseline gauze and a sterile bandage. Nonbinding shoe will be given to the patient as well as some anti-inflammatory pain medication here in the ER and some at home to be used at a work note. Conservative home care recommended. Patient verbalizes understanding and agreement with the above and the following plan. The patient tolerated their visit well. I evaluated the patient. The physician was available for consultation as needed. The patient and / or the family were informed of the results of any tests, a time was given to answer questions, a plan was proposed and they agreed with plan. Home to ice elevate and rest the area, use medication as written, and monitor the area for gradual improvement. Follow-up outpatient with occupational medicine doctor for further care and treatment. Return to the emergency department for any emergency medical condition. CLINICAL IMPRESSION:  1.  Contusion of left foot, initial encounter        DISPOSITION Decision To Discharge 11/10/2021 04:15:17 PM      PATIENT REFERRED TO:  [address]  Call         DISCHARGE MEDICATIONS:  New Prescriptions    DICLOFENAC (VOLTAREN) 50 MG EC TABLET    Take 1 tablet by mouth every 8 hours as needed for Pain (with food)       DISCONTINUED MEDICATIONS:  Discontinued Medications    DICLOFENAC (VOLTAREN) 75 MG EC TABLET    Take 75 mg by mouth 2 times daily    IBUPROFEN (ADVIL;MOTRIN) 200 MG TABLET    Take 200 mg by mouth every 6 hours as needed for Pain    MELOXICAM (MOBIC) 15 MG TABLET    Take 1 tablet by mouth daily as needed for Pain    NAPROXEN SODIUM (ALEVE) 220 MG TABLET    Take 220 mg by mouth 2 times daily (with meals)              (Please note the MDM and HPI sections of this note were completed with a voice recognition program.  Efforts were made to edit the dictations but occasionally words are mis-transcribed.)    Electronically signed, Domonique Morales PA-C,           Domonique Morales PA-C  11/10/21 2506

## 2021-11-12 ENCOUNTER — HOSPITAL ENCOUNTER (EMERGENCY)
Age: 41
Discharge: HOME | End: 2021-11-12
Attending: EMERGENCY MEDICINE
Payer: COMMERCIAL

## 2021-11-12 ENCOUNTER — HOSPITAL ENCOUNTER (OUTPATIENT)
Dept: MRI IMAGING | Age: 41
Discharge: HOME | End: 2021-11-12
Payer: COMMERCIAL

## 2021-11-12 ENCOUNTER — APPOINTMENT (OUTPATIENT)
Dept: GENERAL RADIOLOGY | Age: 41
End: 2021-11-12
Payer: COMMERCIAL

## 2021-11-12 VITALS
HEIGHT: 70 IN | RESPIRATION RATE: 16 BRPM | SYSTOLIC BLOOD PRESSURE: 114 MMHG | DIASTOLIC BLOOD PRESSURE: 80 MMHG | HEART RATE: 74 BPM | TEMPERATURE: 98.2 F | OXYGEN SATURATION: 97 % | BODY MASS INDEX: 26.61 KG/M2 | WEIGHT: 185.85 LBS

## 2021-11-12 DIAGNOSIS — S90.32XA CONTUSION OF LEFT FOOT, INITIAL ENCOUNTER: Primary | ICD-10-CM

## 2021-11-12 DIAGNOSIS — M19.012 ARTHRITIS OF LEFT ACROMIOCLAVICULAR JOINT: ICD-10-CM

## 2021-11-12 DIAGNOSIS — M75.82 ROTATOR CUFF TENDONITIS, LEFT: ICD-10-CM

## 2021-11-12 PROCEDURE — 6370000000 HC RX 637 (ALT 250 FOR IP): Performed by: PHYSICIAN ASSISTANT

## 2021-11-12 PROCEDURE — 73221 MRI JOINT UPR EXTREM W/O DYE: CPT

## 2021-11-12 PROCEDURE — 99283 EMERGENCY DEPT VISIT LOW MDM: CPT

## 2021-11-12 PROCEDURE — 73630 X-RAY EXAM OF FOOT: CPT

## 2021-11-12 RX ORDER — ACETAMINOPHEN 325 MG/1
650 TABLET ORAL EVERY 6 HOURS PRN
Qty: 30 TABLET | Refills: 0 | Status: SHIPPED | OUTPATIENT
Start: 2021-11-12 | End: 2022-02-17 | Stop reason: ALTCHOICE

## 2021-11-12 RX ORDER — NAPROXEN 500 MG/1
500 TABLET ORAL 2 TIMES DAILY PRN
Qty: 30 TABLET | Refills: 0 | Status: SHIPPED | OUTPATIENT
Start: 2021-11-12 | End: 2022-02-17 | Stop reason: ALTCHOICE

## 2021-11-12 RX ORDER — ACETAMINOPHEN 500 MG
1000 TABLET ORAL ONCE
Status: COMPLETED | OUTPATIENT
Start: 2021-11-12 | End: 2021-11-12

## 2021-11-12 RX ADMIN — ACETAMINOPHEN 1000 MG: 500 TABLET ORAL at 11:23

## 2021-11-12 ASSESSMENT — PAIN DESCRIPTION - LOCATION: LOCATION: FOOT

## 2021-11-12 ASSESSMENT — PAIN DESCRIPTION - PAIN TYPE: TYPE: ACUTE PAIN

## 2021-11-12 ASSESSMENT — PAIN SCALES - GENERAL
PAINLEVEL_OUTOF10: 10
PAINLEVEL_OUTOF10: 10

## 2021-11-12 ASSESSMENT — PAIN DESCRIPTION - ORIENTATION: ORIENTATION: LEFT

## 2021-11-12 ASSESSMENT — ENCOUNTER SYMPTOMS
VOMITING: 0
NAUSEA: 0

## 2021-11-12 ASSESSMENT — PAIN DESCRIPTION - ONSET: ONSET: ON-GOING

## 2021-11-12 ASSESSMENT — PAIN DESCRIPTION - FREQUENCY: FREQUENCY: CONTINUOUS

## 2021-11-12 ASSESSMENT — PAIN DESCRIPTION - DESCRIPTORS: DESCRIPTORS: ACHING

## 2021-11-12 ASSESSMENT — PAIN - FUNCTIONAL ASSESSMENT: PAIN_FUNCTIONAL_ASSESSMENT: PREVENTS OR INTERFERES SOME ACTIVE ACTIVITIES AND ADLS

## 2021-11-12 ASSESSMENT — PAIN DESCRIPTION - PROGRESSION: CLINICAL_PROGRESSION: NOT CHANGED

## 2021-11-12 NOTE — ED PROVIDER NOTES
[address] [facility]        Pt Name: Anuj Bhakta  MRN: [id number]  [place] 1980  Date of evaluation: 11/12/2021  Provider: LEXIS Pearson    This patient was seen and evaluated by the attending physician Dr. Kelsea Lemos     Left foot pain      HISTORY OF PRESENT ILLNESS  (Location/Symptom, Timing/Onset, Context/Setting, Quality, Duration,Modifying Factors, Severity.)   Anuj Bhakta is a 39 y.o. male who presents to the emergencydepartment for continued and worsening left foot pain. He dropped a piece of iron on the foot 2 days ago. Was seen here and had xray that was negative. Pain has been worsening since then. He is taking Voltaren for the pain. He took it this morning already. Denies fever chills nausea vomiting. Denies hx DM. Nursing Notes were reviewed and I agree. OF SYSTEMS    (2-9 systems for level 4, 10 or more for level 5)     Review of Systems   Constitutional: Negative for chills and fever. Gastrointestinal: Negative for nausea and vomiting. Musculoskeletal: Positive for arthralgias. Except as noted above the remainder of the review of systems was reviewed and negative.        PAST MEDICAL HISTORY         Diagnosis Date    Cervical radiculopathy 2/21/2019    C5-6 Per EMG    Cervical stenosis of spine     Cluster headaches     Migraine     Neck pain     bulding discs and cervical stenosis    Stomach ulcer        SURGICAL HISTORY         Procedure Laterality Date    HERNIA REPAIR Bilateral 05/08/2017    lap repair BICHONG, umb hernia    NECK SURGERY  2014    C2-C6 with screws and charles    TONSILLECTOMY      WISDOM TOOTH EXTRACTION         CURRENT MEDICATIONS       Previous Medications    ACETAMINOPHEN (TYLENOL) 500 MG TABLET    Take 2 tablets by mouth every 6 hours as needed for Pain    DICLOFENAC (VOLTAREN) 50 MG EC TABLET    Take 1 tablet by mouth every 8 hours as needed for Pain (with food)    DICLOFENAC SODIUM (VOLTAREN) 1 % GEL    Apply 2-4 g topically 4 times daily as needed for Pain    GABAPENTIN (NEURONTIN) 300 MG CAPSULE        PREDNISONE (DELTASONE) 10 MG TABLET    Take 6 tabs day 1, 5 tabs day 2, 4 tabs day 3, 3 tabs day 4, 2 tabs day 5, 1 tab day 6    SUMATRIPTAN (IMITREX) 100 MG TABLET    Take 0.5 tablets by mouth once as needed for Migraine    TENS UNIT MISC    by Does not apply route    TRAMADOL (ULTRAM) 50 MG TABLET    Take 1 tablet by mouth every 8 hours as needed for Pain for up to 7 days. ALLERGIES     Bentyl [dicyclomine hcl] and Adhesive tape    FAMILY HISTORY           Problem Relation Age of Onset    Heart Disease Mother     Other Mother         COPD    Cancer Father 52        CANCER OF LARYNX    Colon Cancer Sister 43    Cancer Maternal Uncle         liver    Cancer Paternal Aunt         larynx    Heart Disease Maternal Grandmother     Cancer Maternal Uncle         from agent orange     Family Status   Relation Name Status    Mother   at age 62    Father     Verl Raw Sister      MUnc  (Not Specified)    PAunt  (Not Specified)    MGM  (Not Specified)    MUnc  (Not Specified)        SOCIAL HISTORY      reports that he quit smoking about 6 years ago. He smoked 0.00 packs per day. He has never used smokeless tobacco. He reports that he does not drink alcohol and does not use drugs. PHYSICAL EXAM    (up to 7 for level 4, 8 or more for level 5)     ED Triage Vitals   BP Temp Temp src Pulse Resp SpO2 Height Weight   21 0846 -- -- 21 0846 21 0846 21 0846 21 0846 21 0844   114/80   74 16 97 % 5' 10\" (1.778 m) 185 lb 13.6 oz (84.3 kg)       Physical Exam  Constitutional:       General: He is not in acute distress. Appearance: Normal appearance. He is well-developed. He is not ill-appearing, toxic-appearing or diaphoretic. HENT:      Head: Normocephalic and atraumatic.    Pulmonary:      Effort: Pulmonary effort is normal. No respiratory distress. Musculoskeletal:      Cervical back: Normal range of motion and neck supple. Comments: Moderate amount of edema of the entire left foot. Has no erythema or ecchymosis. Has superficial, healed abrasion on the dorsal proximal aspect. Has moderate TTP throughout, worse on the ball fo the foot. No open wounds on the foot. Foot is soft. DP pulse 2+. Ankle nontender and has mildly decreased ROM due to pain. .   Skin:     General: Skin is warm. Neurological:      Mental Status: He is alert. Psychiatric:         Mood and Affect: Mood normal.         Behavior: Behavior normal.         Thought Content: Thought content normal.         Judgment: Judgment normal.         DIFFERENTIAL DIAGNOSIS   Fracture, dislocation, soft tissue injury  Compartment syndrome, other    DIAGNOSTICRESULTS         RADIOLOGY:   Non-plain film images such as CT, Ultrasound and MRI are read by the radiologist. Plain radiographic images are visualized and preliminarily interpreted by LEXIS Richardson with the below findings:      Interpretation per the Radiologist below, if available at the time of this note:    XR FOOT LEFT (MIN 3 VIEWS)   Final Result   Tiny calcification adjacent to the proximal 1st metatarsal appears well   corticated. This could represent a tiny fracture fragment but again this   most likely is old. No acute bony or joint abnormality otherwise identified               LABS:  Labs Reviewed - No data to display    All other labs were within normal range or not returned as of this dictation.     EMERGENCY DEPARTMENT COURSE and DIFFERENTIALDIAGNOSIS/MDM:   Vitals:    Vitals:    11/12/21 0844 11/12/21 0846 11/12/21 1200   BP:  114/80    Pulse:  74    Resp:  16    Temp:   98.2 °F (36.8 °C)   TempSrc:   Oral   SpO2:  97%    Weight: 185 lb 13.6 oz (84.3 kg)     Height:  5' 10\" (1.778 m)        Patient wasnontoxic, well appearing, afebrile with normal vital signs. Saturating well on room air. Foot is neurovascularly intact. Xray left foot was negative at visit 2 days ago. X-ray today shows a tiny calcification adjacent to the proximal first metatarsal appears well-corticated. Could represent a tiny fracture fragment but again this is most likely old. No acute bony or joint abnormality otherwise identified. Low suspicion for fracture at this time. Foot does not appear infected. Pain and swelling likely from contusion. Already has a postop shoe. Already on NSAIDs. Given work note for off until Monday. Follow-up with Ortho and return for worsening        PROCEDURES:  None    FINAL IMPRESSION      1.  Contusion of left foot, initial encounter        DISPOSITION/PLAN   DISPOSITION        PATIENT REFERRED TO:  Maxime Rivero MD  [address] [address]  [phone]    Schedule an appointment as soon as possible for a visit in 3 days  for reevaluation    [facility] Emergency Department  [address] [place] [address]  [phone]    As needed, If symptoms worsen      DISCHARGE MEDICATIONS:  New Prescriptions    No medications on file       (Please note that portions ofthis note were completed with a voice recognition program.  Efforts were made to edit the dictations but occasionally words are mis-transcribed.)    Clayton Duron, [address], [address]  11/12/21 6151

## 2021-11-12 NOTE — ED PROVIDER NOTES
I have personally performed a face to face diagnostic evaluation on this patient. I have fully participated in the care of this patient. I have reviewed and agree with all pertinent clinical information including history, physical exam, diagnostic tests, and the plan. HPI: Quan Pemberton presented with left foot pain for the last 2 days since he dropped a piece of iron on his foot. Patient was seen the day of his injury with normal x-ray. Patient is back because his swelling and pain have increased. No other redness numbness or difficulty moving his toes. Patient has been ambulatory on his foot. Patient has been using ibuprofen with little relief. Patient has been attempting to elevate his foot but admits that he is been walking on it. No other new injuries at this time. See ISMAEL note for further details. Chief Complaint   Patient presents with    Foot Pain     left foot pain. increased swelling since 11/10/2021. pt had piece of iron fall on it at work 2 days ago. Review of Systems: See ISMAEL note  Vital Signs: /80   Pulse 74   Resp 16   Ht 5' 10\" (1.778 m)   Wt 185 lb 13.6 oz (84.3 kg)   SpO2 97%   BMI 26.67 kg/m²     Alert 39 y.o. male who does not appear toxic or acutely ill  HENT: Atraumatic, oral mucosa moist  Neck: Grossly normal ROM  Chest/Lungs: respiratory effort normal   Abdomen: Soft nontender  Extremities: 2+ radial bilaterally, DP and PT pulses equal bilaterally   musculoskeletal: Grossly normal ROM, left foot is soft with no erythema, no wounds, normal range of motion of the toes, normal range of motion of the ankle, tenderness over the dorsal aspect of the foot with some mild swelling. Tenderness over the metatarsals. Normal neurovascular exam.  Skin: No palor or diaphoresis    Medical Decision Making and Plan:  Pertinent Labs & Imaging studies reviewed. (See ISMAEL chart for details)  I agree with assessment and plan. Patient with injury to his left foot.   Repeat x-ray unremarkable. Patient shows no signs of neurovascular injury at this time. Normal pulses. Vital signs stable. Believe that patient has normal progress of swelling after crush injury to his foot. No indication for further work-up at this time. Gave patient return precautions including continued use of rest ice elevation compression. Patient understands and will come back to the ER immediately if there is any other new or worsening symptoms or symptoms or not improving. See ISMAEL note for further details.        Shilpa Gifford MD  11/12/21 5658

## 2021-11-12 NOTE — Clinical Note
Krystle Aly was seen and treated in our emergency department on 11/12/2021. He may return to work on 11/15/2021. If you have any questions or concerns, please don't hesitate to call.       LEXIS Washburn

## 2021-11-16 DIAGNOSIS — M19.012 ARTHRITIS OF LEFT ACROMIOCLAVICULAR JOINT: ICD-10-CM

## 2021-11-16 DIAGNOSIS — M75.82 ROTATOR CUFF TENDONITIS, LEFT: ICD-10-CM

## 2021-11-16 RX ORDER — MELOXICAM 15 MG/1
TABLET ORAL
Qty: 30 TABLET | Refills: 1 | Status: SHIPPED | OUTPATIENT
Start: 2021-11-16 | End: 2022-02-17 | Stop reason: ALTCHOICE

## 2021-11-17 ENCOUNTER — OFFICE VISIT (OUTPATIENT)
Dept: ORTHOPEDIC SURGERY | Age: 41
End: 2021-11-17
Payer: COMMERCIAL

## 2021-11-17 VITALS — HEIGHT: 70 IN | BODY MASS INDEX: 26.2 KG/M2 | WEIGHT: 183 LBS

## 2021-11-17 DIAGNOSIS — M75.82 ROTATOR CUFF TENDONITIS, LEFT: Primary | ICD-10-CM

## 2021-11-17 PROCEDURE — G8419 CALC BMI OUT NRM PARAM NOF/U: HCPCS | Performed by: ORTHOPAEDIC SURGERY

## 2021-11-17 PROCEDURE — G8484 FLU IMMUNIZE NO ADMIN: HCPCS | Performed by: ORTHOPAEDIC SURGERY

## 2021-11-17 PROCEDURE — 99214 OFFICE O/P EST MOD 30 MIN: CPT | Performed by: ORTHOPAEDIC SURGERY

## 2021-11-17 PROCEDURE — 1036F TOBACCO NON-USER: CPT | Performed by: ORTHOPAEDIC SURGERY

## 2021-11-17 PROCEDURE — G8427 DOCREV CUR MEDS BY ELIG CLIN: HCPCS | Performed by: ORTHOPAEDIC SURGERY

## 2021-11-17 RX ORDER — TRAMADOL HYDROCHLORIDE 50 MG/1
50 TABLET ORAL EVERY 8 HOURS PRN
Qty: 21 TABLET | Refills: 0 | Status: SHIPPED | OUTPATIENT
Start: 2021-11-17 | End: 2021-11-24

## 2021-11-17 RX ORDER — METHYLPREDNISOLONE 4 MG/1
TABLET ORAL
Qty: 1 KIT | Refills: 0 | Status: SHIPPED | OUTPATIENT
Start: 2021-11-17 | End: 2021-11-23

## 2021-11-17 RX ORDER — IBUPROFEN 200 MG
200 TABLET ORAL EVERY 6 HOURS PRN
COMMUNITY
End: 2022-02-17 | Stop reason: ALTCHOICE

## 2021-11-17 NOTE — LETTER
[facility]  [place] [address] [address] [address]  Phone: [phone]  Fax: [phone]    Don Jules MD        November 17, 2021     Patient: Eligio Martinez   YOB: 1980   Date of Visit: 11/17/2021       To Whom It May Concern: It is my medical opinion that Santos Garcia may return to light duty with the following restrictions: 20 lb weight limit with the left upper extremity, no overhead work for 2 weeks. He may then return to full duty with no restrictions. If you have any questions or concerns, please don't hesitate to call.     Sincerely,          Don Jules MD

## 2021-11-17 NOTE — PROGRESS NOTES
Leyla Bell  [id number]  November 17, 2021    Chief Complaint   Patient presents with    Follow-up     MRI results, Left shoulder. History: The patient is a 71-year-old gentleman who is here for evaluation of his left shoulder. He continues to have moderate left shoulder pain. He cannot recall a specific injury. He does do a great deal of weight lifting and he feels as if he injured it in the gym. The patient has an extensive history with regards to his neck. He had C 2 to C 6 posterior cervical fusion back in 2014. He reports a relatively uneventful recovery. 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 The patient reports left shoulder pain with overhead activity. He does have difficulty sleeping on his left side. The left shoulder injection received approximately 2 months ago provided minimal relief. He has been taking meloxicam and periodically taking ibuprofen. The patient's  past medical history, medications, allergies,  family history, social history, and have been reviewed, and dated and are recorded in the chart. Pertinent items are noted in HPI. Review of systems reviewed from Pertinent History Form dated on 9/21/21 and available in the posterior cervical fusion back in 2014. Chart under the Media tab. Vitals:  Ht 5' 10\" (1.778 m)   Wt 183 lb (83 kg)   BMI 26.26 kg/m²     Physical: Physical: The patient presents today in no acute distress. He is alert and oriented to person, place and time. He displays appropriate mood and affect. He is well dressed, nourished and  groomed. He has a normal affect. Examination of the neck reveals no evidence of tenderness. Spurling's sign is negative. Active range of motion of the left shoulder is: 175 degrees abduction, 175 degrees forward flexion, 45 degrees of external rotation and internal rotation to L1.  Passive range of motion of the left shoulder is: 180 degrees abduction, 180 degrees forward flexion, 50 degrees of external rotation and internal rotation to T11. Active and passive range of motion of the opposite shoulder is full. Examination of the left shoulder reveals positive Neer and Barclay' impingement signs. There is mild subacromial crepitus with range of motion. Drop arm test is negative today. Speed's test is negative. There is moderate tenderness over the Bicipital groove. He  does have tenderness [facility] the [facility] [place] joint. Cross body adduction test is positive. He has moderate tenderness over the subacromial space. There is no evidence of scapular winging. Lift off sign is negative. There is no evidence of atrophy. External rotation strength is full. There are no skin lesions, cellulitis, or extreme edema in the upper extremities. Sensation is intact to axillary, median, radial, and ulnar nerves bilaterally. The patient has warm and well-perfused bilateral upper extremities with brisk capillary refill. Radial and ulnar pulses are palpable and 2+ bilaterally. X-rays: MRI of the left shoulder was extensively reviewed. The MRI confirms diffuse rotator cuff tendinitis. There is moderate AC joint arthritis. The rotator cuff is intact. There is no clear evidence of labral injury or tear. Impression: #1 left shoulder rotator cuff tendinitis #2 left shoulder AC joint arthritis    Plan: At this time, we will continue with conservative treatment. The patient was given an outpatient prescription for therapy. He was given a Medrol Dosepak. He was also given a final prescription of tramadol. The patient was given a light duty order for the next week. He will follow-up with me in 6 weeks and we will reassess him then.

## 2021-12-01 ENCOUNTER — OFFICE VISIT (OUTPATIENT)
Dept: ORTHOPEDIC SURGERY | Age: 41
End: 2021-12-01
Payer: COMMERCIAL

## 2021-12-01 VITALS — WEIGHT: 183 LBS | BODY MASS INDEX: 26.2 KG/M2 | HEIGHT: 70 IN

## 2021-12-01 DIAGNOSIS — S90.32XA CONTUSION OF LEFT FOOT, INITIAL ENCOUNTER: Primary | ICD-10-CM

## 2021-12-01 DIAGNOSIS — M79.672 LEFT FOOT PAIN: ICD-10-CM

## 2021-12-01 PROCEDURE — 99203 OFFICE O/P NEW LOW 30 MIN: CPT | Performed by: ORTHOPAEDIC SURGERY

## 2021-12-01 RX ORDER — MELOXICAM 7.5 MG/1
7.5 TABLET ORAL DAILY
Qty: 30 TABLET | Refills: 0 | Status: SHIPPED | OUTPATIENT
Start: 2021-12-01 | End: 2022-10-21 | Stop reason: ALTCHOICE

## 2021-12-01 NOTE — PROGRESS NOTES
CHIEF COMPLAINT: Left foot pain/ midfoot contusion    DATE OF INJURY: 11/10/2021, Worker's Comp. HISTORY:  Mr. Farn Castro 39 y.o.   male  presents today for the first visit for evaluation of a left foot injury which occurred when he dropped a large piece of metal and it fell on his foot. He was wearing his steel toe shoes. He is complaining of foot pain and swelling. Rates pain a 7/10 VAS. This is better with elevation and worse with bearing wt. The pain is sharp and not radiating. No other complaint. He was seen 1st at [facility], where he was x-rayed and splinted and asked to f/u with orthopaedics. Denies smoking. He did take 1 week off work but has since been back full duty.     Past Medical History:   Diagnosis Date    Cervical radiculopathy 2019    C5-6 Per EMG    Cervical stenosis of spine     Cluster headaches     Migraine     Neck pain     bulding discs and cervical stenosis    Stomach ulcer        Past Surgical History:   Procedure Laterality Date    HERNIA REPAIR Bilateral 2017    lap repair BIH, umb hernia    NECK SURGERY  2014    C2-C6 with screws and charles    TONSILLECTOMY      WISDOM TOOTH EXTRACTION         Social History     Socioeconomic History    Marital status: Single     Spouse name: Not on file    Number of children: Not on file    Years of education: Not on file    Highest education level: Not on file   Occupational History    Not on file   Tobacco Use    Smoking status: Former Smoker     Packs/day: 0.00     Quit date: 10/28/2015     Years since quittin.0    Smokeless tobacco: Never Used   Vaping Use    Vaping Use: Never used   Substance and Sexual Activity    Alcohol use: No     Comment: SOCIALLY- VERY RARE     Drug use: No    Sexual activity: Not on file   Other Topics Concern    Not on file   Social History Narrative    Not on file     Social Determinants of Health     Financial Resource Strain: Low Risk     Difficulty of Paying Living Expenses: Not hard at all   Food Insecurity: No Food Insecurity    Worried About [facility] in the Last Year: Never true    Ad of Food in the Last Year: Never true   Transportation Needs: No Transportation Needs    Lack of Transportation (Medical): No    Lack of Transportation (Non-Medical):  No   Physical Activity:     Days of Exercise per Week: Not on file    Minutes of Exercise per Session: Not on file   Stress:     Feeling of Stress : Not on file   Social Connections:     Frequency of Communication with Friends and Family: Not on file    Frequency of Social Gatherings with Friends and Family: Not on file    Attends Congregation Services: Not on file    Active Member of [address] [place] [address] or Organizations: Not on file    Attends Club or Organization Meetings: Not on file    Marital Status: Not on file   Intimate Partner Violence:     Fear of Current or Ex-Partner: Not on file    Emotionally Abused: Not on file    Physically Abused: Not on file    Sexually Abused: Not on file   Housing Stability:     Unable to Pay for Housing in the Last Year: Not on file    Number of [place] in the Last Year: Not on file    Unstable Housing in the Last Year: Not on file       Family History   Problem Relation Age of Onset    Heart Disease Mother     Other Mother         COPD    Cancer Father 52        CANCER OF LARYNX    Colon Cancer Sister 43    Cancer Maternal Uncle         liver    Cancer Paternal Aunt         larynx    Heart Disease Maternal Grandmother     Cancer Maternal Uncle         from agent orange       Current Outpatient Medications on File Prior to Visit   Medication Sig Dispense Refill    ibuprofen (ADVIL;MOTRIN) 200 MG tablet Take 200 mg by mouth every 6 hours as needed for Pain      meloxicam (MOBIC) 15 MG tablet TAKE ONE TABLET BY MOUTH DAILY AS NEEDED FOR PAIN (Patient not taking: Reported on 11/17/2021) 30 tablet 1    naproxen (NAPROSYN) 500 MG tablet Take 1 tablet by mouth 2 times daily as needed for Pain (Patient not taking: Reported on 11/17/2021) 30 tablet 0    acetaminophen (AMINOFEN) 325 MG tablet Take 2 tablets by mouth every 6 hours as needed for Pain 30 tablet 0    [DISCONTINUED] diclofenac (VOLTAREN) 50 MG EC tablet Take 1 tablet by mouth every 8 hours as needed for Pain (with food) 15 tablet 0    Tens Unit MISC by Does not apply route      [DISCONTINUED] naproxen sodium (ALEVE) 220 MG tablet Take 220 mg by mouth 2 times daily (with meals)      predniSONE (DELTASONE) 10 MG tablet Take 6 tabs day 1, 5 tabs day 2, 4 tabs day 3, 3 tabs day 4, 2 tabs day 5, 1 tab day 6 (Patient not taking: Reported on 11/17/2021) 21 tablet 0    [DISCONTINUED] diclofenac sodium (VOLTAREN) 1 % GEL Apply 2-4 g topically 4 times daily as needed for Pain 150 g 1    gabapentin (NEURONTIN) 300 MG capsule       SUMAtriptan (IMITREX) 100 MG tablet Take 0.5 tablets by mouth once as needed for Migraine 12 tablet 0     No current facility-administered medications on file prior to visit. Pertinent items are noted in HPI  Review of systems reviewed from Patient History Form dated on 9/21/2021 and available in the patient's chart under the Media tab. No change noted. PHYSICAL EXAMINATION:  Mr. Brett Bell is a very pleasant 39 y.o.  male who presents today in no acute distress, awake, alert, and oriented. He is well dressed, nourished and  groomed. Patient with normal affect. Height is  5' 10\" (1.778 m), weight is 183 lb (83 kg), Body mass index is 26.26 kg/m². Resting respiratory rate is 16. Examination of the gait, showed that the patient walks with a slight limp, WB left leg . Examination of both ankles showing a decreased range of motion of the left ankle compare to the other side because of foot pain. There is mild swelling that can be seen, as well as ecchymosis over lateral side of the left foot. He  has intact sensation and good pedal pulses.   He has significant tenderness on deep palpation over the left midfoot. IMAGING: Abhijeet Lei were reviewed, Dated today in office and compared to x-rays dated 11/10/2021 taken at [address] [place] [address], 3 views of the left  foot, and showed no displaced fracture. IMPRESSION: Left foot contusion. PLAN:  I assured the patient that the xray is negative for acute fracture. We discussed the risk of nondisplaced fracture. We applied a short boot today in the office and instructed the patient  in care. He can take NSAIDs, meloxicam as needed, Rx sent. We will see him back in 3 weeks at which time we will get a new xray of the left foot. He can return back to work full duty with sitting rest periods as needed.     Leopold Grant, MD

## 2021-12-01 NOTE — LETTER
[facility]  [place] [address] [address] [address]  Phone: [phone]  Fax: [phone]    Kevin Lopez MD        December 1, 2021     Patient: Clementina Ordonez   YOB: 1980   Date of Visit: 12/1/2021       To Whom It May Concern: It is my medical opinion that Sujata Telles may return to full duty immediately with no restrictions. If you have any questions or concerns, please don't hesitate to call.     Sincerely,        Kevin Lopez MD

## 2021-12-05 PROBLEM — S90.32XA CONTUSION OF LEFT FOOT: Status: ACTIVE | Noted: 2021-12-05

## 2022-01-21 ENCOUNTER — TELEPHONE (OUTPATIENT)
Dept: FAMILY MEDICINE CLINIC | Age: 42
End: 2022-01-21

## 2022-01-21 DIAGNOSIS — M72.2 PLANTAR FASCIITIS, RIGHT: Primary | ICD-10-CM

## 2022-01-21 DIAGNOSIS — M75.82 ROTATOR CUFF TENDONITIS, LEFT: ICD-10-CM

## 2022-01-21 RX ORDER — TRAMADOL HYDROCHLORIDE 50 MG/1
50 TABLET ORAL EVERY 8 HOURS PRN
Qty: 21 TABLET | Status: CANCELLED | OUTPATIENT
Start: 2022-01-21 | End: 2022-01-28

## 2022-01-21 RX ORDER — TRAMADOL HYDROCHLORIDE 50 MG/1
50 TABLET ORAL EVERY 6 HOURS PRN
Qty: 20 TABLET | Refills: 0 | Status: SHIPPED | OUTPATIENT
Start: 2022-01-21 | End: 2022-02-17 | Stop reason: SDUPTHER

## 2022-01-21 NOTE — TELEPHONE ENCOUNTER
Place on my schedule for heel injection in next 3-4 weeks  oarrs reviewed and results c/w rx'd meds  #20 tramadol filled to last until appointment - can take w/ tylenol o/w can use ibuprofen

## 2022-01-21 NOTE — TELEPHONE ENCOUNTER
----- Message from Dinorah Stevens sent at 1/21/2022  8:50 AM EST -----  Subject: Refill Request    QUESTIONS  Name of Medication? traMADol (ULTRAM) 50 MG tablet  Patient-reported dosage and instructions? as needed  How many days do you have left? 0  Preferred Pharmacy? [address]  Pharmacy phone number (if available)? [phone]  ---------------------------------------------------------------------------  --------------  Lauren CHAN  What is the best way for the office to contact you? OK to leave message on   voicemail  Preferred Call Back Phone Number?  [phone]

## 2022-01-21 NOTE — TELEPHONE ENCOUNTER
LAST VISIT 09/13/2021 WITH PATRIZIA MAK CNP, NEXT VISIT NONE. [address]      NOTE FROM DR Myron Fuller ON 11/17/2021:  Impression: #1 left shoulder rotator cuff tendinitis #2 left shoulder AC joint arthritis     Plan: At this time, we will continue with conservative treatment. The patient was given an outpatient prescription for therapy. He was given a Medrol Dosepak. He was also given a final prescription of tramadol. The patient was given a light duty order for the next week. He will follow-up with me in 6 weeks and we will reassess him then.     traMADol (ULTRAM) 50 MG tablet [[id number]]  ENDED    Order Details  Dose: 50 mg Route: Oral Frequency: EVERY 8 HOURS PRN for Pain   Dispense Quantity: 21 tablet Refills: 0    Note to Pharmacy: Reduce doses taken as pain becomes manageable         Sig: Take 1 tablet by mouth every 8 hours as needed for Pain for up to 7 days.         Start Date: 11/17/21 End Date: 11/24/21   Written Date: 11/17/21 Expiration Date: 05/16/22       Diagnosis Association: Rotator cuff tendonitis, left (M75.82)     Providers    Authorizing Provider: Corrine Mota MD NPI: [id number]   Ordering User:  Corrine Mota, [address] [phone] [address] [id number]   Phone:  [phone]  Fax:  [phone]

## 2022-01-21 NOTE — TELEPHONE ENCOUNTER
I CALLED PT THIS IS SOMETHING [facility] USED TO GIVE HIM FOR HIS FOOT LIKE 20 PILLS AT A TIME UNTIL HE COULD COME IN TO GET A SHOT IN HIS FOOT. HE WANTS THE SHOT BUT NO ONE DOES THAT OTHER THAN [facility]. Alie Eagle

## 2022-02-08 ENCOUNTER — TELEPHONE (OUTPATIENT)
Dept: ORTHOPEDIC SURGERY | Age: 42
End: 2022-02-08

## 2022-02-17 ENCOUNTER — OFFICE VISIT (OUTPATIENT)
Dept: FAMILY MEDICINE CLINIC | Age: 42
End: 2022-02-17
Payer: COMMERCIAL

## 2022-02-17 VITALS
HEART RATE: 83 BPM | BODY MASS INDEX: 27.06 KG/M2 | SYSTOLIC BLOOD PRESSURE: 138 MMHG | WEIGHT: 189 LBS | OXYGEN SATURATION: 95 % | DIASTOLIC BLOOD PRESSURE: 84 MMHG | HEIGHT: 70 IN

## 2022-02-17 DIAGNOSIS — M72.2 PLANTAR FASCIITIS OF RIGHT FOOT: Primary | ICD-10-CM

## 2022-02-17 DIAGNOSIS — M72.2 PLANTAR FASCIITIS, RIGHT: ICD-10-CM

## 2022-02-17 PROCEDURE — 20550 NJX 1 TENDON SHEATH/LIGAMENT: CPT | Performed by: FAMILY MEDICINE

## 2022-02-17 RX ORDER — TRAMADOL HYDROCHLORIDE 50 MG/1
50 TABLET ORAL EVERY 6 HOURS PRN
Qty: 30 TABLET | Refills: 1 | Status: SHIPPED | OUTPATIENT
Start: 2022-02-17 | End: 2022-03-21 | Stop reason: SDUPTHER

## 2022-02-17 RX ORDER — METHYLPREDNISOLONE ACETATE 80 MG/ML
40 INJECTION, SUSPENSION INTRA-ARTICULAR; INTRALESIONAL; INTRAMUSCULAR; SOFT TISSUE ONCE
Status: COMPLETED | OUTPATIENT
Start: 2022-02-17 | End: 2022-02-17

## 2022-02-17 RX ADMIN — METHYLPREDNISOLONE ACETATE 40 MG: 80 INJECTION, SUSPENSION INTRA-ARTICULAR; INTRALESIONAL; INTRAMUSCULAR; SOFT TISSUE at 17:37

## 2022-02-17 NOTE — PROGRESS NOTES
Subjective:      Patient ID: Laurita Suarez is a 39 y.o. male. HPI  Here for injection right plantar fasciitis - usually helps for months  Has had before - gets periodic flares  Recently got tramadol for pain which helps - wants refill  Tolerates well and no side effects  Stands long days on concrete  Trying to do stretching regularly and has gel inserts which help  But flares periodically - worse last 4-6 weeks  No problems on left side  Review of Systems    Objective:   Physical Exam  Constitutional:       General: He is not in acute distress. Appearance: He is well-developed. Eyes:      General: No scleral icterus. Pulmonary:      Effort: Pulmonary effort is normal.   Musculoskeletal:      Comments: Tender medial to midline mid plantar calcaneus w/o inflammation/ swelling   Skin:     General: Skin is warm and dry. Neurological:      Mental Status: He is alert and oriented to person, place, and time. Assessment:       Diagnosis Orders   1. Plantar fasciitis of right foot  methylPREDNISolone acetate (DEPO-MEDROL) injection 40 mg   2.  Plantar fasciitis, right  traMADol (ULTRAM) 50 MG tablet    methylPREDNISolone acetate (DEPO-MEDROL) injection 40 mg           Plan:      40mg depomedrol / 1 cc 0.25% sensorcaine injected medially into area of maximal pain  Pt tolerated well - risks/ expectations dw/ pt  Cont ice/ stretching/ gel inserts and consider ankle brace to keep foot at 90 degrees  Refill tramadol to take w/ tylenol along w/ anti-inflammatories prn pain flares  F/u prn        Bing Stone MD

## 2022-02-18 ENCOUNTER — OFFICE VISIT (OUTPATIENT)
Dept: ORTHOPEDIC SURGERY | Age: 42
End: 2022-02-18
Payer: COMMERCIAL

## 2022-02-18 VITALS — BODY MASS INDEX: 26.2 KG/M2 | WEIGHT: 183 LBS | HEIGHT: 70 IN

## 2022-02-18 DIAGNOSIS — M75.82 ROTATOR CUFF TENDONITIS, LEFT: ICD-10-CM

## 2022-02-18 DIAGNOSIS — M19.012 ARTHRITIS OF LEFT ACROMIOCLAVICULAR JOINT: Primary | ICD-10-CM

## 2022-02-18 PROCEDURE — G8484 FLU IMMUNIZE NO ADMIN: HCPCS | Performed by: ORTHOPAEDIC SURGERY

## 2022-02-18 PROCEDURE — 99213 OFFICE O/P EST LOW 20 MIN: CPT | Performed by: ORTHOPAEDIC SURGERY

## 2022-02-18 PROCEDURE — G8419 CALC BMI OUT NRM PARAM NOF/U: HCPCS | Performed by: ORTHOPAEDIC SURGERY

## 2022-02-18 PROCEDURE — 20610 DRAIN/INJ JOINT/BURSA W/O US: CPT | Performed by: ORTHOPAEDIC SURGERY

## 2022-02-18 PROCEDURE — 1036F TOBACCO NON-USER: CPT | Performed by: ORTHOPAEDIC SURGERY

## 2022-02-18 PROCEDURE — G8427 DOCREV CUR MEDS BY ELIG CLIN: HCPCS | Performed by: ORTHOPAEDIC SURGERY

## 2022-02-18 RX ORDER — BUPIVACAINE HYDROCHLORIDE 2.5 MG/ML
3 INJECTION, SOLUTION INFILTRATION; PERINEURAL ONCE
Status: COMPLETED | OUTPATIENT
Start: 2022-02-18 | End: 2022-02-18

## 2022-02-18 RX ORDER — TRIAMCINOLONE ACETONIDE 40 MG/ML
80 INJECTION, SUSPENSION INTRA-ARTICULAR; INTRAMUSCULAR ONCE
Status: COMPLETED | OUTPATIENT
Start: 2022-02-18 | End: 2022-02-18

## 2022-02-18 RX ORDER — IBUPROFEN 800 MG/1
800 TABLET ORAL 2 TIMES DAILY WITH MEALS
Qty: 60 TABLET | Refills: 1 | Status: SHIPPED | OUTPATIENT
Start: 2022-02-18 | End: 2022-04-18

## 2022-02-18 RX ADMIN — TRIAMCINOLONE ACETONIDE 80 MG: 40 INJECTION, SUSPENSION INTRA-ARTICULAR; INTRAMUSCULAR at 09:03

## 2022-02-18 RX ADMIN — BUPIVACAINE HYDROCHLORIDE 7.5 MG: 2.5 INJECTION, SOLUTION INFILTRATION; PERINEURAL at 09:02

## 2022-02-18 NOTE — PROGRESS NOTES
Jose G Mosley  [id number]  February 18, 2022    Chief Complaint   Patient presents with    Follow-up     Left shoulder       History: The patient is a 66-year-old gentleman who is here for evaluation of his left shoulder. The patient received a left shoulder injection back in September. He did respond favorably. He does have a new job and he is working at the [employer]. He has received a Medrol Dosepak in the past without much relief. He does have pain with overhead activities. He has difficulty sleeping on his left side. He continues to have moderate left shoulder pain. He has not had any recent injury. He does do a great deal of weight lifting. The patient has an extensive history with regards to his neck. He had C 2 to C 6 posterior cervical fusion back in 2014. He reports a relatively uneventful recovery. 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 The patient's  past medical history, medications, allergies,  family history, social history, and have been reviewed, and dated and are recorded in the chart. Pertinent items are noted in HPI. Review of systems reviewed from Pertinent History Form dated on 9/21/21 and available in the posterior cervical fusion back in 2014. Chart under the Media tab. Vitals:  Ht 5' 10\" (1.778 m)   Wt 183 lb (83 kg)   BMI 26.26 kg/m²     Physical: Physical: The patient presents today in no acute distress. He is alert and oriented to person, place and time. He displays appropriate mood and affect. He is well dressed, nourished and  groomed. He has a normal affect. Examination of the neck reveals no evidence of tenderness. Spurling's sign is negative. Active range of motion of the left shoulder is: 175 degrees abduction, 175 degrees forward flexion, 45 degrees of external rotation and internal rotation to L1.  Passive range of motion of the left shoulder is: 180 degrees abduction, 180 degrees forward flexion, 50 degrees of external rotation and internal rotation to T11. Active and passive range of motion of the opposite shoulder is full. Examination of the left shoulder reveals positive Neer and Barclay' impingement signs. There is mild subacromial crepitus with range of motion. Drop arm test is negative today. Speed's test is negative. There is moderate tenderness over the Bicipital groove. He  does have tenderness over the [facility] joint. Cross body adduction test is positive. He has moderate tenderness over the subacromial space. There is no evidence of scapular winging. Lift off sign is negative. There is no evidence of atrophy. External rotation strength is full. There are no skin lesions, cellulitis, or extreme edema in the upper extremities. Sensation is intact to axillary, median, radial, and ulnar nerves bilaterally. The patient has warm and well-perfused bilateral upper extremities with brisk capillary refill. Radial and ulnar pulses are palpable and 2+ bilaterally. X-rays: MRI of the left shoulder was again extensively reviewed. The MRI confirms diffuse rotator cuff tendinitis. There is moderate AC joint arthritis. The rotator cuff is intact. There is no clear evidence of labral injury or tear. Impression: #1 left shoulder rotator cuff tendinitis #2 left shoulder AC joint arthritis    Plan: At this time we will inject the left shoulder under sterile conditions. After a Betadine prep of the shoulder, 3cc of 0.25% Marcaine and 2cc of 40 mg Kenalog were injected into the shoulder. The patient tolerated the injection rather well. The patient was instructed to follow up immediately for any signs of infection. The patient will follow up with me in 6 week(s). The treatment plan was discussed in detail with the patient and includes activity modification and avoidance of repetitive overhead activities. Home exercises were explained to the patient.  A formal therapy program was discussed with the patient and was not indicated. If the patient continues to have severe pain and weakness, we will consider other options. He was given a prescription for ibuprofen 800 mg twice a day with food.

## 2022-03-21 ENCOUNTER — PATIENT MESSAGE (OUTPATIENT)
Dept: FAMILY MEDICINE CLINIC | Age: 42
End: 2022-03-21

## 2022-03-21 DIAGNOSIS — M72.2 PLANTAR FASCIITIS, RIGHT: ICD-10-CM

## 2022-03-21 RX ORDER — TRAMADOL HYDROCHLORIDE 50 MG/1
50 TABLET ORAL EVERY 6 HOURS PRN
Qty: 30 TABLET | Refills: 1 | Status: SHIPPED | OUTPATIENT
Start: 2022-03-21 | End: 2022-07-28 | Stop reason: SDUPTHER

## 2022-03-21 NOTE — TELEPHONE ENCOUNTER
From: Valery Babin [place]  To: Dr. Aimee Diaz: 3/21/2022 10:47 AM EDT  Subject: Refill    I was wondering if it would be possible to get a refill of my tramadol sent to the pharmacy

## 2022-04-18 RX ORDER — IBUPROFEN 800 MG/1
TABLET ORAL
Qty: 60 TABLET | Refills: 1 | Status: SHIPPED | OUTPATIENT
Start: 2022-04-18

## 2022-07-01 ENCOUNTER — OFFICE VISIT (OUTPATIENT)
Dept: ORTHOPEDIC SURGERY | Age: 42
End: 2022-07-01
Payer: COMMERCIAL

## 2022-07-01 VITALS — BODY MASS INDEX: 25.77 KG/M2 | WEIGHT: 180 LBS | HEIGHT: 70 IN

## 2022-07-01 DIAGNOSIS — M75.22 BICEPS TENDONITIS, LEFT: ICD-10-CM

## 2022-07-01 DIAGNOSIS — M75.82 ROTATOR CUFF TENDONITIS, LEFT: Primary | ICD-10-CM

## 2022-07-01 DIAGNOSIS — M19.012 ARTHRITIS OF LEFT ACROMIOCLAVICULAR JOINT: ICD-10-CM

## 2022-07-01 PROCEDURE — G8427 DOCREV CUR MEDS BY ELIG CLIN: HCPCS | Performed by: ORTHOPAEDIC SURGERY

## 2022-07-01 PROCEDURE — G8419 CALC BMI OUT NRM PARAM NOF/U: HCPCS | Performed by: ORTHOPAEDIC SURGERY

## 2022-07-01 PROCEDURE — 1036F TOBACCO NON-USER: CPT | Performed by: ORTHOPAEDIC SURGERY

## 2022-07-01 PROCEDURE — 99213 OFFICE O/P EST LOW 20 MIN: CPT | Performed by: ORTHOPAEDIC SURGERY

## 2022-07-01 RX ORDER — METHYLPREDNISOLONE 4 MG/1
TABLET ORAL
Qty: 1 KIT | Refills: 0 | Status: SHIPPED | OUTPATIENT
Start: 2022-07-01 | End: 2022-07-07

## 2022-07-01 NOTE — PROGRESS NOTES
Rajani Hay  [id number]  July 1, 2022    Chief Complaint   Patient presents with    Follow-up     Left shoulder       History: The patient is a 80-year-old gentleman who is here for evaluation of his left shoulder. The patient received a left shoulder injection back in February which did provide relief. He was doing rather well up until a week ago. He began having moderate to severe left shoulder pain. He does workout on a regular basis. He does have pain with overhead activities. He has difficulty sleeping on his left side. He continues to have moderate left shoulder pain. He has not had any recent injury. He does do a great deal of weight lifting. The patient has an extensive history with regards to his neck. He had C 2 to C 6 posterior cervical fusion back in 2014. He reports a relatively uneventful recovery. 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 The patient's  past medical history, medications, allergies,  family history, social history, and have been reviewed, and dated and are recorded in the chart. Pertinent items are noted in HPI. Review of systems reviewed from Pertinent History Form dated on 9/21/21 and available in the posterior cervical fusion back in 2014. Chart under the Media tab. Vitals:  Ht 5' 10\" (1.778 m)   Wt 180 lb (81.6 kg)   BMI 25.83 kg/m²     Physical: Physical: The patient presents today in no acute distress. He is alert and oriented to person, place and time. He displays appropriate mood and affect. He is well dressed, nourished and  groomed. He has a normal affect. Examination of the neck reveals no evidence of tenderness. Spurling's sign is negative. Active range of motion of the left shoulder is: 175 degrees abduction, 175 degrees forward flexion, 45 degrees of external rotation and internal rotation to L1.  Passive range of motion of the left shoulder is: 180 degrees abduction, 180 degrees forward flexion, 50 degrees of external rotation and internal rotation to T11. Active and passive range of motion of the opposite shoulder is full. Examination of the left shoulder reveals positive Neer and Barclay' impingement signs. There is mild subacromial crepitus with range of motion. Drop arm test is negative today. Speed's test does cause significant pain. There is moderate tenderness over the Bicipital groove. He  does have tenderness over the [facility] joint. Cross body adduction test is positive. He has moderate tenderness over the subacromial space. There is no evidence of scapular winging. Lift off sign is negative. There is no evidence of atrophy. External rotation strength is full. There are no skin lesions, cellulitis, or extreme edema in the upper extremities. Sensation is intact to axillary, median, radial, and ulnar nerves bilaterally. The patient has warm and well-perfused bilateral upper extremities with brisk capillary refill. Radial and ulnar pulses are palpable and 2+ bilaterally. X-rays: MRI of the left shoulder performed back in February was again extensively reviewed. The MRI confirms diffuse rotator cuff tendinitis. There is moderate AC joint arthritis. The rotator cuff is intact. There is no clear evidence of labral injury or tear. Impression: #1 left shoulder rotator cuff tendinitis #2 left shoulder AC joint arthritis #3 left shoulder biceps tendinitis    Plan: At this time, we did go over all treatment options at length. The patient was encouraged to modify his activities. He will ice the affected area. The patient was encouraged to modify his activities. He was given a Medrol Dosepak. He will stop the ibuprofen while taking the steroids. The patient will follow up with me in approximately 1 month and we will reassess him then. If he continues to have severe pain, we will consider an injection.

## 2022-07-28 ENCOUNTER — PATIENT MESSAGE (OUTPATIENT)
Dept: FAMILY MEDICINE CLINIC | Age: 42
End: 2022-07-28

## 2022-07-28 DIAGNOSIS — M72.2 PLANTAR FASCIITIS, RIGHT: ICD-10-CM

## 2022-07-28 RX ORDER — TRAMADOL HYDROCHLORIDE 50 MG/1
50 TABLET ORAL EVERY 6 HOURS PRN
Qty: 30 TABLET | Refills: 0 | Status: SHIPPED | OUTPATIENT
Start: 2022-07-28 | End: 2022-10-06 | Stop reason: SDUPTHER

## 2022-07-28 NOTE — TELEPHONE ENCOUNTER
He has been getting multiple refills on this ~3 months at a time since last October. Let's schedule him with Dr. Shivam Haynes specifically for an appointment so they can discuss whether he needs a UDS and med contract. Once scheduled, I'll refill.

## 2022-07-28 NOTE — TELEPHONE ENCOUNTER
From: Brenden Cuevas  To: Dr. Sanjuanita Arreola: 7/28/2022 11:07 AM EDT  Subject: Refill     Any way I could get a refill of the pain medication that you give me for my plantar fasciitis

## 2022-10-21 ENCOUNTER — TELEMEDICINE (OUTPATIENT)
Dept: FAMILY MEDICINE CLINIC | Age: 42
End: 2022-10-21
Payer: COMMERCIAL

## 2022-10-21 DIAGNOSIS — J34.89 SINUS PRESSURE: Primary | ICD-10-CM

## 2022-10-21 DIAGNOSIS — M72.2 PLANTAR FASCIITIS, RIGHT: ICD-10-CM

## 2022-10-21 PROCEDURE — 1036F TOBACCO NON-USER: CPT | Performed by: FAMILY MEDICINE

## 2022-10-21 PROCEDURE — 99213 OFFICE O/P EST LOW 20 MIN: CPT | Performed by: FAMILY MEDICINE

## 2022-10-21 PROCEDURE — G8484 FLU IMMUNIZE NO ADMIN: HCPCS | Performed by: FAMILY MEDICINE

## 2022-10-21 PROCEDURE — G8427 DOCREV CUR MEDS BY ELIG CLIN: HCPCS | Performed by: FAMILY MEDICINE

## 2022-10-21 PROCEDURE — G8419 CALC BMI OUT NRM PARAM NOF/U: HCPCS | Performed by: FAMILY MEDICINE

## 2022-10-21 RX ORDER — TRAMADOL HYDROCHLORIDE 50 MG/1
50 TABLET ORAL EVERY 6 HOURS PRN
Qty: 20 TABLET | Refills: 0 | Status: SHIPPED | OUTPATIENT
Start: 2022-10-21 | End: 2022-10-26

## 2022-10-21 RX ORDER — AMOXICILLIN 875 MG/1
875 TABLET, COATED ORAL 2 TIMES DAILY
Qty: 14 TABLET | Refills: 0 | Status: SHIPPED | OUTPATIENT
Start: 2022-10-21 | End: 2022-10-28

## 2022-10-21 ASSESSMENT — PATIENT HEALTH QUESTIONNAIRE - PHQ9
SUM OF ALL RESPONSES TO PHQ QUESTIONS 1-9: 0
SUM OF ALL RESPONSES TO PHQ QUESTIONS 1-9: 0
2. FEELING DOWN, DEPRESSED OR HOPELESS: 0
SUM OF ALL RESPONSES TO PHQ QUESTIONS 1-9: 0
1. LITTLE INTEREST OR PLEASURE IN DOING THINGS: 0
SUM OF ALL RESPONSES TO PHQ9 QUESTIONS 1 & 2: 0
SUM OF ALL RESPONSES TO PHQ QUESTIONS 1-9: 0

## 2022-10-21 NOTE — PROGRESS NOTES
Zelda Brennan (:  1980) is a Established patient, here for evaluation of the following:    Assessment & Plan    Diagnosis Orders   1. Sinus pressure        2. Plantar fasciitis, right  traMADol (ULTRAM) 50 MG tablet      Encourage mucinex dm and consider neti-pot for congestion  In lieu of tylenol sinus  Se d/w pt  Amoxil 875 bid for 7 days if significantly worsening sxs over weekend or if fails to improve in next several days  Please eat yogurt daily or take probiotic supplement while on this antibiotic and for additional week after finishing the antibiotic to protect your GI tract. Tramadol - #20 refill to use prn only for severe pain  D/w pt needs to limit use to severe pain only  Stretching/ heel pads to continue - f/u podiatry if fails to improve as injections no longer helping  oarrs reviewed and results c/w rx'd meds    Below is the assessment and plan developed based on review of pertinent history, physical exam, labs, studies, and medications. Subjective   HPI  Chief Complaint   Patient presents with    Congestion     CONGESTION IN HEAD EYES AND NOSE FEEL FULL AND SWOLLEN NO FEVER NO SORE THROAT. YELLOW MUCUS, STARTED ABOUT 5 DAYS AGO HAS TRIED TYLENOL FLU AND SEVERE SINUS    A lot of pressure in face/ sinuses - pressure around eyes  Stopped up but also dripping at same time - thick more clear than yellow. Using tylenol severe sinus  Afrin tried but irritating      Review of Systems   Dealing w/ left RC tendonitis/ arthritis/ plantar fasciitis  Recently given tramadol for pain - periodically for flares  Heel eased up some - tramadol helps - stretching regularly  Shots not helping a lot. Has inserts.   Used 30 tramadol in last 15 days - would like a few more    Objective   Patient-Reported Vitals       BP Readings from Last 3 Encounters:   22 138/84   21 114/80   11/10/21 118/79     Pulse Readings from Last 3 Encounters:   22 83   21 74   11/10/21 77     Wt Readings from Last 3 Encounters:   07/01/22 180 lb (81.6 kg)   02/18/22 183 lb (83 kg)   02/17/22 189 lb (85.7 kg)     Current Outpatient Medications   Medication Sig Dispense Refill    ibuprofen (ADVIL;MOTRIN) 800 MG tablet TAKE ONE TABLET BY MOUTH TWICE A DAY WITH MEALS 60 tablet 1    diclofenac sodium (VOLTAREN) 1 % GEL Apply 2 g topically 2 times daily as needed for Pain 50 g 0    Tens Unit MISC by Does not apply route      SUMAtriptan (IMITREX) 100 MG tablet Take 0.5 tablets by mouth once as needed for Migraine 12 tablet 0     No current facility-administered medications for this visit. Physical Exam  Nad  Alert, conversant - easy resp       22 minute visit    Primitivo Loyola, was evaluated through a synchronous (real-time) audio-video encounter. The patient (or guardian if applicable) is aware that this is a billable service, which includes applicable co-pays. This Virtual Visit was conducted with patient's (and/or legal guardian's) consent. The visit was conducted pursuant to the emergency declaration under the [facility], [address] [place] [address] authority and the [employer] General Act. Patient identification was verified, and a caregiver was present when appropriate. The patient was located at Home: [place] [id number]. Provider was located at [place] [id number] (Appt Dept): [address] [place] [address],  [address]         --Ivonne Herzog MD

## 2022-11-02 RX ORDER — IBUPROFEN 800 MG/1
TABLET ORAL
Qty: 60 TABLET | Refills: 1 | OUTPATIENT
Start: 2022-11-02

## 2022-12-02 ENCOUNTER — TELEMEDICINE (OUTPATIENT)
Dept: FAMILY MEDICINE CLINIC | Age: 42
End: 2022-12-02
Payer: COMMERCIAL

## 2022-12-02 DIAGNOSIS — R09.81 CHRONIC NASAL CONGESTION: Primary | ICD-10-CM

## 2022-12-02 DIAGNOSIS — M72.2 PLANTAR FASCIITIS, RIGHT: ICD-10-CM

## 2022-12-02 PROCEDURE — 1036F TOBACCO NON-USER: CPT | Performed by: FAMILY MEDICINE

## 2022-12-02 PROCEDURE — G8419 CALC BMI OUT NRM PARAM NOF/U: HCPCS | Performed by: FAMILY MEDICINE

## 2022-12-02 PROCEDURE — 99213 OFFICE O/P EST LOW 20 MIN: CPT | Performed by: FAMILY MEDICINE

## 2022-12-02 PROCEDURE — G8484 FLU IMMUNIZE NO ADMIN: HCPCS | Performed by: FAMILY MEDICINE

## 2022-12-02 PROCEDURE — G8428 CUR MEDS NOT DOCUMENT: HCPCS | Performed by: FAMILY MEDICINE

## 2022-12-02 RX ORDER — TRAMADOL HYDROCHLORIDE 50 MG/1
50 TABLET ORAL EVERY 6 HOURS PRN
Qty: 20 TABLET | Refills: 0 | Status: SHIPPED | OUTPATIENT
Start: 2022-12-02 | End: 2022-12-07

## 2022-12-02 NOTE — PROGRESS NOTES
Abhishek Martin (:  1980) is a Established patient, here for evaluation of the following:    Assessment & Plan    Diagnosis Orders   1. Chronic nasal congestion  Ambulatory referral to ENT      2. Plantar fasciitis, right  traMADol (ULTRAM) 50 MG tablet        Refer to ENT for nasal evaluation - likely deviated nasal septum on right  - hold on ct pending ENT eval  D/w pt using decongestant sprays - hesitant to use nasal steroid 2/2 bad burning w/ flonase - consider nasal saline, afrin short-term, nasal strips  Refer to podiatry for orthotics -continue stretching - hold on further injections (steroid) as not helping as much over time  Continue ibuprofen prn and add #20 tramadol for bad flares - use judiciously - se d/w pt  oarrs reviewed and results c/w rx'd meds  Last fill #20 mid October  F/u prn  But should have labs done in next year at wellness visit    Below is the assessment and plan developed based on review of pertinent history, physical exam, labs, studies, and medications. Subjective   HPI  Chief Complaint   Patient presents with    Other     ONGOING SINUS ISSUES WANTS TO SEE ENT. Seen 10/21 for virtual visit for sinusitis for 5 days. Problems for years w/ sinusitis - nostrils closing up no on right side - hx of fracture nose twice earlier in life  Hard to sleep as waking hard to breathe  Going on for 20 years but seems to be getting worse  Using mucinex nasal spray - helps short-term. No ear sxs/ drainage/ ear / throat or chest sxs  Flonase irritated and didn't help.     Still problems w/ plantar fascitis  Most right >left but pain can be intense at times - uses ibuprofen but needs tramadol to walk w/o bad limp when really bad - still doing stretching regularly  Works -standing/ walking on concrete long days  Ongoing problem for long time  Uses otc heel pad which helps a little    Reviewed ortho note / shoulder injection 22  Review of Systems       Objective   Patient-Reported Vitals  No data recorded     Physical Exam     Nad, speech clear/ face clear    21 minutes spent on visit total time - including review of recent labs    Kiet Block, was evaluated through a synchronous (real-time) audio-video encounter. The patient (or guardian if applicable) is aware that this is a billable service, which includes applicable co-pays. This Virtual Visit was conducted with patient's (and/or legal guardian's) consent. The visit was conducted pursuant to the emergency declaration under the [address] [place] [address], [address] [place] [address] authority and the [employer] General Act. Patient identification was verified, and a caregiver was present when appropriate. The patient was located at Home: [place] [id number]. Provider was located at [facility] (Appt Dept): [address] [place] [address] [place],  [address]         --Alva Gonzalez MD

## 2022-12-09 ENCOUNTER — OFFICE VISIT (OUTPATIENT)
Dept: ENT CLINIC | Age: 42
End: 2022-12-09

## 2022-12-09 VITALS
WEIGHT: 182 LBS | DIASTOLIC BLOOD PRESSURE: 76 MMHG | BODY MASS INDEX: 26.05 KG/M2 | HEART RATE: 74 BPM | SYSTOLIC BLOOD PRESSURE: 121 MMHG | HEIGHT: 70 IN

## 2022-12-09 DIAGNOSIS — J34.3 HYPERTROPHY OF INFERIOR NASAL TURBINATE: ICD-10-CM

## 2022-12-09 DIAGNOSIS — R09.81 NASAL CONGESTION: Primary | ICD-10-CM

## 2022-12-09 DIAGNOSIS — J34.2 DEVIATED SEPTUM: ICD-10-CM

## 2022-12-09 DIAGNOSIS — J31.0 RHINITIS MEDICAMENTOSA: ICD-10-CM

## 2022-12-09 DIAGNOSIS — T48.5X5A RHINITIS MEDICAMENTOSA: ICD-10-CM

## 2022-12-09 NOTE — PROGRESS NOTES
[facility]      Patient Name: Hector Stone  Medical Record Number:  [id number]  Primary Care Physician:  Kami Ngo MD  Date of Consultation: 12/9/2022    Chief Complaint: Nasal congestion        HISTORY OF PRESENT ILLNESS  Tunde Olvera is a(n) 43 y.o. male who presents for evaluation nasal congestion. The patient said he has battled this nasal congestion for years. He thinks that he broke his nose 2 different times when he was a child. He has bilateral nasal congestion that is worse on the right side. He has tried Flonase and irrigations in the past.  He is also tried over-the-counter medications such as decongestants. He has also been using Afrin. He has used Afrin on and off again for years. He currently is using it multiple times a day. He denies changes in smell. He has had a few sinus infections, but it is not persistent. He has never had nasal surgery. REVIEW OF SYSTEMS  As above    PHYSICAL EXAM  GENERAL: No Acute Distress, Alert and Oriented, no Hoarseness, strong voice  EYES: EOMI, Anti-icteric  HENT:   Head: Normocephalic and atraumatic. Face:  Symmetric, facial nerve intact, no sinus tenderness  Right Ear: Normal external ear, normal external auditory canal, intact tympanic membrane with normal mobility and aerated middle ear  Left Ear: Normal external ear, normal external auditory canal, intact tympanic membrane with normal mobility and aerated middle ear  Mouth/Oral Cavity:  normal lips, Uvula is midline, no mucosal lesions, no trismus,  Oropharynx/Larynx: Status post tonsillectomy  Nose: See below  NECK: Normal range of motion, no thyromegaly, trachea is midline, no lymphadenopathy, no neck masses, no crepitus          PROCEDURE  Nasal exam and nasal endoscopy  The external nose is symmetric. He has a little bit of valve collapse on the left side, but not severe. Clearly has better airflow through the left side.   A rigid scope was used to visualize left nasal cavity. He has a large turbinate. More posteriorly there were polyps or purulent drainage. On the right side he has a severe rightward anterior septal deviation that actually gets worse more posteriorly. It seems to involve both the cartilaginous and bony septum. He also has a large turbinate that the septum is touching. No polyps or purulent drainage        ASSESSMENT/PLAN  1. Nasal congestion  This patient has a severe deviated septum and large turbinates. He is tried multiple different medications without significant relief. At this point I think the only thing left is to do a septoplasty and turbinate reduction. We discussed the risk of this includes postoperative bleeding and ongoing nasal congestion. I also told him that I would like for him to stop the Afrin and go to plain Flonase and irrigations as sometimes people have more scarring and issues if they continue the Afrin up until surgery and afterwards. The patient agrees with this plan and we will get him scheduled for surgery. 2. Deviated septum  Septoplasty    3. Hypertrophy of inferior nasal turbinate  Inferior turbinate reduction    4. Rhinitis medicamentosa  I encouraged him to stop the Afrin at this time. I have performed a head and neck physical exam personally or was physically present during the key or critical portions of the service. This note was generated completely or in part utilizing Dragon dictation speech recognition software. Occasionally, words are mistranscribed and despite editing, the text may contain inaccuracies due to incorrect word recognition. If further clarification is needed please contact the office at [phone].

## 2023-01-12 RX ORDER — NIACINAMIDE 500 MG
1 TABLET, EXTENDED RELEASE ORAL DAILY
COMMUNITY

## 2023-01-12 NOTE — PROGRESS NOTES
[facility] time___0755_________        Surgery time___0955_________    Take the following medications with a sip of water: Follow your MD/Surgeons pre-procedure instructions regarding your medications     Do not eat or drink anything after 12:00 midnight prior to your surgery. This includes water chewing gum, mints and ice chips. You may brush your teeth and gargle the morning of your surgery, but do not swallow the water     Please see your family doctor/pediatrician for a history and physical and/or concerning medications. Bring any test results/reports from your physicians office. If you are under the care of a heart doctor or specialist doctor, please be aware that you may be asked to them for clearance    You may be asked to stop blood thinners such as Coumadin, Plavix, Fragmin, Lovenox, etc., or any anti-inflammatories such as:  Aspirin, Ibuprofen, Advil, Naproxen prior to your surgery. We also ask that you stop any OTC medications such as fish oil, vitamin E, glucosamine, garlic, Multivitamins, COQ 10, etc. MAY TAKE TYLENOL    We ask that you do not smoke 24 hours prior to surgery  We ask that you do not  drink any alcoholic beverages 24 hours prior to surgery     You must make arrangements for a responsible adult to take you home after your surgery. For your safety you will not be allowed to leave alone or drive yourself home. Your surgery will be cancelled if you do not have a ride home. Also for your safety, it is strongly suggested that someone stay with you the first 24 hours after your surgery. A parent or legal guardian must accompany a child scheduled for surgery and plan to stay at the hospital until the child is discharged. Please do not bring other children with you. For your comfort, please wear simple loose fitting clothing to the hospital.  Please do not bring valuables.     Do not wear any make-up or nail polish on your fingers or toes      For your safety, please do not wear any jewelry or body piercing's on the day of surgery. All jewelry must be removed. If you have dentures, they will be removed before going to operating room. For your convenience, we will provide you with a container. If you wear contact lenses or glasses, they will be removed, please bring a case for them. If you have a living will and a durable power of  for healthcare, please bring in a copy. As part of our patient safety program to minimize surgical site infections, we ask you to do the following:    Please notify your surgeon if you develop any illness between         now and the  day of your surgery. This includes a cough, cold, fever, sore throat, nausea,         or vomiting, and diarrhea, etc.   Please notify your surgeon if you experience dizziness, shortness         of breath or blurred vision between now and the time of your surgery. Do not shave your operative site 96 hours prior to surgery. For face and neck surgery, men may use an electric razor 48 hours   prior to surgery. You may shower the night before surgery or the morning of   your surgery with an antibacterial soap. You will need to bring a photo ID and insurance card    [facility] has an onsite pharmacy, would you like to utilize our pharmacy     If you will be staying overnight and use a C-pap machine, please bring   your C-pap to hospital     Our goal is to provide you with excellent care, therefore, visitors will be limited to two(2) in the room at a time so that we may focus on providing this care for you. Please contact pre-admission testing if you have any further questions.                  [facility] phone number:  [address] [facility] fax number:  [phone]  Please note these are generalized instructions for all surgical cases, you may be provided with more specific instructions according to your surgery. C-Difficile admission screening and protocol:       * Admitted with diarrhea? [] YES    [x]  NO     *Prior history of C-Diff. In last 3 months? [] YES    [x]  NO     *Antibiotic use in the past 6-8 weeks? [x]  NO    []  YES                 If yes, which ANTIBIOTIC AND REASON______     *Prior hospitalization or nursing home in the last month? []  YES    [x]  NO        SAFETY FIRST. .call before you fall

## 2023-01-12 NOTE — PROGRESS NOTES
DOS 23   80    PATIENT HAS APPOINTMENT ON 23  @ 1040 WITH PCP DR. PULLIAM-([facility])

## 2023-01-13 ENCOUNTER — OFFICE VISIT (OUTPATIENT)
Dept: FAMILY MEDICINE CLINIC | Age: 43
End: 2023-01-13
Payer: COMMERCIAL

## 2023-01-13 VITALS
BODY MASS INDEX: 26.34 KG/M2 | SYSTOLIC BLOOD PRESSURE: 114 MMHG | WEIGHT: 184 LBS | HEART RATE: 66 BPM | DIASTOLIC BLOOD PRESSURE: 70 MMHG | OXYGEN SATURATION: 99 % | HEIGHT: 70 IN

## 2023-01-13 DIAGNOSIS — M72.2 PLANTAR FASCIITIS OF RIGHT FOOT: ICD-10-CM

## 2023-01-13 DIAGNOSIS — Z13.220 LIPID SCREENING: ICD-10-CM

## 2023-01-13 DIAGNOSIS — R09.81 NASAL CONGESTION: ICD-10-CM

## 2023-01-13 DIAGNOSIS — R73.9 HYPERGLYCEMIA: ICD-10-CM

## 2023-01-13 DIAGNOSIS — M50.30 DDD (DEGENERATIVE DISC DISEASE), CERVICAL: ICD-10-CM

## 2023-01-13 DIAGNOSIS — R74.01 ELEVATED ALT MEASUREMENT: ICD-10-CM

## 2023-01-13 DIAGNOSIS — J34.2 DEVIATED NASAL SEPTUM: Primary | ICD-10-CM

## 2023-01-13 DIAGNOSIS — M72.2 PLANTAR FASCIITIS, RIGHT: ICD-10-CM

## 2023-01-13 DIAGNOSIS — G44.019 EPISODIC CLUSTER HEADACHE, NOT INTRACTABLE: ICD-10-CM

## 2023-01-13 PROCEDURE — G8484 FLU IMMUNIZE NO ADMIN: HCPCS | Performed by: FAMILY MEDICINE

## 2023-01-13 PROCEDURE — G8427 DOCREV CUR MEDS BY ELIG CLIN: HCPCS | Performed by: FAMILY MEDICINE

## 2023-01-13 PROCEDURE — 99214 OFFICE O/P EST MOD 30 MIN: CPT | Performed by: FAMILY MEDICINE

## 2023-01-13 PROCEDURE — 1036F TOBACCO NON-USER: CPT | Performed by: FAMILY MEDICINE

## 2023-01-13 PROCEDURE — G8419 CALC BMI OUT NRM PARAM NOF/U: HCPCS | Performed by: FAMILY MEDICINE

## 2023-01-13 RX ORDER — TRAMADOL HYDROCHLORIDE 50 MG/1
50 TABLET ORAL EVERY 6 HOURS PRN
Qty: 20 TABLET | Refills: 0 | Status: SHIPPED | OUTPATIENT
Start: 2023-01-13 | End: 2023-01-18

## 2023-01-13 ASSESSMENT — PATIENT HEALTH QUESTIONNAIRE - PHQ9
SUM OF ALL RESPONSES TO PHQ QUESTIONS 1-9: 0
SUM OF ALL RESPONSES TO PHQ QUESTIONS 1-9: 0
1. LITTLE INTEREST OR PLEASURE IN DOING THINGS: 0
2. FEELING DOWN, DEPRESSED OR HOPELESS: 0
SUM OF ALL RESPONSES TO PHQ QUESTIONS 1-9: 0
SUM OF ALL RESPONSES TO PHQ9 QUESTIONS 1 & 2: 0
SUM OF ALL RESPONSES TO PHQ QUESTIONS 1-9: 0

## 2023-01-13 NOTE — PROGRESS NOTES
[facility]  Clinic Note    Date: 1/13/2023                                               Subjective:     Chief Complaint   Patient presents with    Pre-op Exam     PRE OP DR. HERNANDEZ, 1/30 NOSE SURGERY AND TUBES PLACED        HPI    MOM ON BLOOD THINNER BUT NOT SURE OF REASON  O/w no fam/ personal hx of bleed/clotting/ anesthesia rxn  Last labs 3/21 - alt 46 and glucose 110  Hx of cluster headaches - seen by neuro in past  Uses imitrex prn  Gets every couple years - severe headache for couple weeks  Uses tramadol prn for severe plantar fascitis if ibuprofen not enough  Overall better right > left w/ inserts and bracing at night  But a lot of standing/ walking long shifts on concrete  BP Readings from Last 3 Encounters:   01/13/23 114/70   12/09/22 121/76   02/17/22 138/84     Pulse Readings from Last 3 Encounters:   01/13/23 66   12/09/22 74   02/17/22 83     Wt Readings from Last 3 Encounters:   01/13/23 184 lb (83.5 kg)   12/09/22 182 lb (82.6 kg)   07/01/22 180 lb (81.6 kg)            Patient Active Problem List    Diagnosis Date Noted    Contusion of left foot 12/05/2021    Cervical radiculopathy 02/21/2019    LUQ pain 12/20/2017    Bilateral inguinal hernia without obstruction or gangrene 04/26/2017    Umbilical hernia without obstruction and without gangrene 04/26/2017    Cervical spinal stenosis 08/08/2014    Cervical 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/2014     Past Medical History:   Diagnosis Date    Arthritis     Cervical radiculopathy 02/21/2019    C5-6 Per EMG    Cervical stenosis of spine     Cluster headaches     Migraine     Nasal fracture     X2-AS A CHILD    Neck pain     bulding discs and cervical stenosis    Stomach ulcer      Past Surgical History:   Procedure Laterality Date    HERNIA REPAIR Bilateral 05/08/2017    lap repair BIH, umb hernia    NECK SURGERY  2014    C2-C6 with screws and charles    TONSILLECTOMY      WISDOM TOOTH EXTRACTION       Admission on 03/23/2021, Discharged on 03/23/2021  Component Date Value Ref Range Status    WBC 03/23/2021 9.9  4.0 - 11.0 K/uL Final    RBC 03/23/2021 5.01  4.20 - 5.90 M/uL Final    Hemoglobin 03/23/2021 15.7  13.5 - 17.5 g/dL Final    Hematocrit 03/23/2021 44.5  40.5 - 52.5 % Final    MCV 03/23/2021 88.9  80.0 - 100.0 fL Final    MCH 03/23/2021 31.3  26.0 - 34.0 pg Final    MCHC 03/23/2021 35.2  31.0 - 36.0 g/dL Final    RDW 03/23/2021 12.7  12.4 - 15.4 % Final    Platelets 16/79/8747 168  135 - 450 K/uL Final    MPV 03/23/2021 8.6  5.0 - 10.5 fL Final    Neutrophils % 03/23/2021 84.6  % Final    Lymphocytes % 03/23/2021 9.5  % Final    Monocytes % 03/23/2021 5.2  % Final    Eosinophils % 03/23/2021 0.4  % Final    Basophils % 03/23/2021 0.3  % Final    Neutrophils Absolute 03/23/2021 8.3 (A)  1.7 - 7.7 K/uL Final    Lymphocytes Absolute 03/23/2021 0.9 (A)  1.0 - 5.1 K/uL Final    Monocytes Absolute 03/23/2021 0.5  0.0 - 1.3 K/uL Final    Eosinophils Absolute 03/23/2021 0.0  0.0 - 0.6 K/uL Final    Basophils Absolute 03/23/2021 0.0  0.0 - 0.2 K/uL Final    Sodium 03/23/2021 140  136 - 145 mmol/L Final    Potassium reflex Magnesium 03/23/2021 3.5  3.5 - 5.1 mmol/L Final    Chloride 03/23/2021 98 (A)  99 - 110 mmol/L Final    CO2 03/23/2021 28  21 - 32 mmol/L Final    Anion Gap 03/23/2021 14  3 - 16 Final    Glucose 03/23/2021 110 (A)  70 - 99 mg/dL Final    BUN 03/23/2021 27 (A)  7 - 20 mg/dL Final    Creatinine 03/23/2021 0.8 (A)  0.9 - 1.3 mg/dL Final    GFR Non- 03/23/2021 >60  >60 Final    Comment: >60 mL/min/1.73m2 EGFR, calc. for ages 25 and older using the  MDRD formula (not corrected for weight), is valid for stable  renal function. GFR  03/23/2021 >60  >60 Final    Comment: Chronic Kidney Disease: less than 60 ml/min/1.73 sq.m. Kidney Failure: less than 15 ml/min/1.73 sq.m. Results valid for patients 18 years and older.       Calcium 03/23/2021 9.6  8.3 - 10.6 mg/dL Final    Total Protein 03/23/2021 7.0  6.4 - 8.2 g/dL Final    Albumin 03/23/2021 4.8  3.4 - 5.0 g/dL Final    Albumin/Globulin Ratio 03/23/2021 2.2  1.1 - 2.2 Final    Total Bilirubin 03/23/2021 0.5  0.0 - 1.0 mg/dL Final    Alkaline Phosphatase 03/23/2021 82  40 - 129 U/L Final    ALT 03/23/2021 46 (A)  10 - 40 U/L Final    AST 03/23/2021 37  15 - 37 U/L Final    Globulin 03/23/2021 2.2  g/dL Final    Magnesium 03/23/2021 2.00  1.80 - 2.40 mg/dL Final     Family History   Problem Relation Age of Onset    Heart Disease Mother     Other Mother         COPD    High Blood Pressure Father     Cancer Father 52        CANCER OF LARYNX    Colon Cancer Sister 43    Cancer Maternal Uncle         liver    Cancer Maternal Uncle         from agent orange    Cancer Paternal Aunt         larynx    Heart Disease Maternal Grandmother      Current Outpatient Medications   Medication Sig Dispense Refill    Multiple Vitamin (MULTIVITAMIN ADULT PO) Take 1 tablet by mouth daily      Digestive Enzymes CAPS Take 1 tablet by mouth daily      NONFORMULARY Take 1 tablet by mouth daily Beef organs      ibuprofen (ADVIL;MOTRIN) 800 MG tablet TAKE ONE TABLET BY MOUTH TWICE A DAY WITH MEALS 60 tablet 1    diclofenac sodium (VOLTAREN) 1 % GEL Apply 2 g topically 2 times daily as needed for Pain 50 g 0    Tens Unit MISC by Does not apply route      SUMAtriptan (IMITREX) 100 MG tablet Take 0.5 tablets by mouth once as needed for Migraine (Patient taking differently: Take 50 mg by mouth once as needed for Migraine RARELY TAKES) 12 tablet 0     No current facility-administered medications for this visit. Allergies   Allergen Reactions    Bentyl [Dicyclomine Hcl] Hives    Adhesive Tape Rash     PLASTIC ADHESIVE TAPE-CAN USE PAPER TAPE       Review of Systems    Objective: There were no vitals taken for this visit.     BP Readings from Last 3 Encounters:   12/09/22 121/76   02/17/22 138/84   11/12/21 114/80       Pulse Readings from Last 3 Encounters:   12/09/22 74   02/17/22 83   11/12/21 74       Wt Readings from Last 3 Encounters:   12/09/22 182 lb (82.6 kg)   07/01/22 180 lb (81.6 kg)   02/18/22 183 lb (83 kg)       Physical Exam  Constitutional:       General: He is not in acute distress. Appearance: He is well-developed. HENT:      Right Ear: Tympanic membrane and ear canal normal.      Left Ear: Tympanic membrane and ear canal normal.   Eyes:      General: No scleral icterus. Conjunctiva/sclera: Conjunctivae normal.   Cardiovascular:      Rate and Rhythm: Normal rate and regular rhythm. Heart sounds: Normal heart sounds. No murmur heard. No gallop. Pulmonary:      Effort: Pulmonary effort is normal. No respiratory distress. Breath sounds: Normal breath sounds. No wheezing, rhonchi or rales. Abdominal:      General: Bowel sounds are normal. There is no distension. Palpations: Abdomen is soft. Tenderness: There is no abdominal tenderness. Skin:     Findings: No erythema or rash. Neurological:      Mental Status: He is alert. Assessment/Plan:      Diagnosis Orders   1. Deviated nasal septum        2. Nasal congestion        3. Hyperglycemia        4. Elevated ALT measurement        5. Lipid screening        6.  Plantar fasciitis of right foot        Cluster headache - seen by neuro - imitrex prn  No recent infection/ fever/ cardio/ pulm sxs or pmh of heart/ lung issues  Clear for headache      Fasting labs soon  Refill tramadol to take prn for severe plantar fasciitis  Doing better w/ new inserts and brace at night  Refill tramadol #20 to use prn severe flares w/ ibuprofen  oarrs reviewed and results c/w rx'd meds    Pre-Operative Risk assessment using 2014 ACC/AHA guidelines     Emergent procedure NO  Active Cardiac Condition NO (decompensated HF, Arrhythmia, MI <3 weeks, severe valve disease)  Risk Level of Procedure Intermediate Risk (intraperitoneal, intrathoracic, HENT, orthopedic, or carotid endarterectomy, etc.)  Revised Cardiac Risk Index Risk factors: None  Measurement of Exercise Tolerance before Surgery >4 YES    According to the 2014 ACC/AHA pre-operative risk assessment guidelines Kelly Kenney is a low risk for major cardiac complications during a intermediate risk procedure and may continue as planned. Specific medication recommendations are listed below. Medications recommended to continue should be taken with a sip of water even when NPO.      Further recommendations from consultants: None    Medication Recommendations:  No asa, nsaids, vitamins, supplements 1 week prior    See H and P form    Chris De La Vega MD, MD  1/13/2023  10:45 AM

## 2023-01-20 ENCOUNTER — ANESTHESIA EVENT (OUTPATIENT)
Dept: OPERATING ROOM | Age: 43
End: 2023-01-20
Payer: COMMERCIAL

## 2023-01-23 NOTE — ANESTHESIA PRE PROCEDURE
Department of Anesthesiology  Preprocedure Note       Name:  Araseli Menjivar   Age:  43 y.o.  :  1980                                          MRN:  [id number]         Date:  2023      Surgeon: Francy Mobley):  Chioma Esteves MD    Procedure: Procedure(s):  SEPTOPLASTY, INFERIOR TURBINATE REDUCTION    Medications prior to admission:   Prior to Admission medications    Medication Sig Start Date End Date Taking?  Authorizing Provider   Multiple Vitamin (MULTIVITAMIN ADULT PO) Take 1 tablet by mouth daily  Patient not taking: Reported on 2023   Yes Historical Provider, MD   Digestive Enzymes CAPS Take 1 tablet by mouth daily  Patient not taking: Reported on 2023   Yes Historical Provider, MD   NONFORMULARY Take 1 tablet by mouth daily Beef organs  Patient not taking: Reported on 2023   Yes Historical Provider, MD   ibuprofen (ADVIL;MOTRIN) 800 MG tablet TAKE ONE TABLET BY MOUTH TWICE A DAY WITH MEALS  Patient not taking: Reported on 2023   Gem Neil MD   diclofenac sodium (VOLTAREN) 1 % GEL Apply 2 g topically 2 times daily as needed for Pain  Patient not taking: Reported on 2023   Gem Neil MD   diclofenac (VOLTAREN) 50 MG EC tablet Take 1 tablet by mouth every 8 hours as needed for Pain (with food) 11/10/21 11/12/21  Luis Fernando Webb PA-C   Tens Unit MISC by Does not apply route  Patient not taking: No sig reported    Historical Provider, MD   naproxen sodium (ALEVE) 220 MG tablet Take 220 mg by mouth 2 times daily (with meals)  11/10/21  Historical Provider, MD   SUMAtriptan (IMITREX) 100 MG tablet Take 0.5 tablets by mouth once as needed for Migraine  Patient taking differently: Take 50 mg by mouth once as needed for Migraine RARELY TAKES 2/3/21 1/13/23  Tobi Rocha MD       Current medications:    Current Facility-Administered Medications   Medication Dose Route Frequency Provider Last Rate Last Admin    sodium chloride flush 0.9 % injection 5-40 mL  5-40 mL IntraVENous 2 times per day Ad Liriano MD       • sodium chloride flush 0.9 % injection 5-40 mL  5-40 mL IntraVENous PRN Ad Liriano MD       • 0.9 % sodium chloride infusion   IntraVENous PRN Ad Liriano MD           Allergies:    Allergies   Allergen Reactions   • Bentyl [Dicyclomine Hcl] Hives   • Adhesive Tape Rash     PLASTIC ADHESIVE TAPE-CAN USE PAPER TAPE       Problem List:    Patient Active Problem List   Diagnosis Code   • Bilateral inguinal hernia without obstruction or gangrene K40.20   • Umbilical hernia without obstruction and without gangrene K42.9   • Cervical nerve root disorder M54.12   • Cervical spinal stenosis M48.02   • LUQ pain R10.12   • Cervical radiculopathy M54.12   • Contusion of left foot S90.32XA   • Episodic cluster headache, not intractable G44.019   • DDD (degenerative disc disease), cervical M50.30   • Plantar fasciitis of right foot M72.2       Past Medical History:        Diagnosis Date   • Arthritis    • Cervical radiculopathy 2019    C5-6 Per EMG   • Cervical stenosis of spine    • Cluster headaches    • Migraine    • Nasal fracture     X2-AS A CHILD   • Neck pain     bulding discs and cervical stenosis   • Stomach ulcer        Past Surgical History:        Procedure Laterality Date   • HERNIA REPAIR Bilateral 2017    lap repair BIH, umb hernia   • NECK SURGERY  2014    C2-C6 with screws and charles   • TONSILLECTOMY     • WISDOM TOOTH EXTRACTION         Social History:    Social History     Tobacco Use   • Smoking status: Former     Packs/day: 1.00     Years: 15.00     Pack years: 15.00     Types: Cigarettes     Quit date: 10/28/2015     Years since quittin.2   • Smokeless tobacco: Never   Substance Use Topics   • Alcohol use: No     Comment: SOCIALLY- VERY RARE                                 Counseling given: Not Answered      Vital Signs (Current):   Vitals:    23 1103 23 0754   BP:  123/75   Pulse:  60   Resp:  20  Temp:  98.2 °F (36.8 °C)   TempSrc:  Temporal   SpO2:  97%   Weight: 185 lb (83.9 kg) 188 lb 11.4 oz (85.6 kg)   Height: 5' 10\" (1.778 m)                                               BP Readings from Last 3 Encounters:   01/24/23 123/75   01/13/23 114/70   12/09/22 121/76       NPO Status: Time of last liquid consumption: 2300                        Time of last solid consumption: 2300                        Date of last liquid consumption: 01/23/23                        Date of last solid food consumption: 01/23/23    BMI:   Wt Readings from Last 3 Encounters:   01/24/23 188 lb 11.4 oz (85.6 kg)   01/13/23 184 lb (83.5 kg)   12/09/22 182 lb (82.6 kg)     Body mass index is 27.08 kg/m². CBC:   Lab Results   Component Value Date/Time    WBC 9.9 03/23/2021 01:36 AM    RBC 5.01 03/23/2021 01:36 AM    RBC 5.03 11/24/2011 10:25 PM    HGB 15.7 03/23/2021 01:36 AM    HCT 44.5 03/23/2021 01:36 AM    MCV 88.9 03/23/2021 01:36 AM    RDW 12.7 03/23/2021 01:36 AM     03/23/2021 01:36 AM       CMP:   Lab Results   Component Value Date/Time     03/23/2021 01:36 AM    K 3.5 03/23/2021 01:36 AM    CL 98 03/23/2021 01:36 AM    CO2 28 03/23/2021 01:36 AM    BUN 27 03/23/2021 01:36 AM    CREATININE 0.8 03/23/2021 01:36 AM    GFRAA >60 03/23/2021 01:36 AM    AGRATIO 2.2 03/23/2021 01:36 AM    LABGLOM >60 03/23/2021 01:36 AM    GLUCOSE 110 03/23/2021 01:36 AM    GLUCOSE 88 11/24/2011 10:25 PM    PROT 7.0 03/23/2021 01:36 AM    CALCIUM 9.6 03/23/2021 01:36 AM    BILITOT 0.5 03/23/2021 01:36 AM    ALKPHOS 82 03/23/2021 01:36 AM    AST 37 03/23/2021 01:36 AM    ALT 46 03/23/2021 01:36 AM       POC Tests: No results for input(s): POCGLU, POCNA, POCK, POCCL, POCBUN, POCHEMO, POCHCT in the last 72 hours.     Coags:   Lab Results   Component Value Date/Time    PROTIME 10.7 11/24/2011 10:25 PM    INR 1.0 11/24/2011 10:25 PM       HCG (If Applicable): No results found for: PREGTESTUR, PREGSERUM, HCG, HCGQUANT     ABGs: No results found for: PHART, PO2ART, EUQ9HJN, ZDS3OKB, BEART, B0JNHPIY     Type & Screen (If Applicable):  No results found for: LABABO, LABRH    Drug/Infectious Status (If Applicable):  No results found for: HIV, HEPCAB    COVID-19 Screening (If Applicable): No results found for: COVID19        Anesthesia Evaluation   no history of anesthetic complications:   Airway: Mallampati: II  TM distance: >3 FB   Neck ROM: limited  Comment: s/p posterior cervical fusion    + Thick beard  Mouth opening: > = 3 FB   Dental:      Comment: Fair dentition, denies loose teeth    Pulmonary:       (-) rhonchi, wheezes, rales and not a current smoker (former)                          ROS comment: No home inhalers    Per case posting:   Nasal congestion   Severely deviated septum   Hypertrophy of inferior nasal turbinate   Rhinitis medicamentosa  - Instructed to d/c Afrin in December   Cardiovascular:        (-) orthopnea,  BROTHERS, weak pulses and peripheral edema      Rhythm: regular  Rate: normal                 ROS comment: Last EKG from 2014 showed diffuse ST elevation, attributed to pericarditis at time. Neuro/Psych:   (+) headaches: cluster headaches, migraine headaches,    (-) seizures and CVA            ROS comment: Chronic neck pain with cervical radiculopathy  s/p cervical fusion C2-C6    CT C-Spine:  Impression:   1.  Unchanged C3-C6 posterior spinal fusion. 2.  Progressed C6-C7 degenerative disc disease. GI/Hepatic/Renal:   (+) PUD (naproxen listed as home med),      (-) liver disease and no renal disease       Endo/Other:        (-) diabetes mellitus, blood dyscrasia               Abdominal:         (-) obese       Vascular: Other Findings:           Anesthesia Plan      general     ASA 2     (Tape sensitivity, prefers paper tape. Posterior cervical fusion: Plan Glidescope. Surgeon preference for regular ETT taped to left, will confer. NPO appropriate.  Mr. Sae Miller denies active nausea / reflux.)  Induction: intravenous. MIPS: Postoperative opioids intended and Prophylactic antiemetics administered. Anesthetic plan and risks discussed with patient. Use of blood products discussed with patient whom consented to blood products. Plan discussed with CRNA. This pre-anesthesia assessment may be used as a history and physical.    DOS STAFF ADDENDUM:    Pt seen and examined, chart reviewed (including anesthesia, drug and allergy history). No interval changes to history and physical examination. Anesthetic plan, risks, benefits, alternatives, and personnel involved discussed with patient. Patient verbalized an understanding and agrees to proceed.       Jeromy Lock MD  January 24, 2023  8:19 AM

## 2023-01-24 ENCOUNTER — ANESTHESIA (OUTPATIENT)
Dept: OPERATING ROOM | Age: 43
End: 2023-01-24
Payer: COMMERCIAL

## 2023-01-24 ENCOUNTER — HOSPITAL ENCOUNTER (OUTPATIENT)
Age: 43
Setting detail: OUTPATIENT SURGERY
Discharge: HOME | End: 2023-01-24
Attending: OTOLARYNGOLOGY | Admitting: OTOLARYNGOLOGY
Payer: COMMERCIAL

## 2023-01-24 VITALS
BODY MASS INDEX: 27.02 KG/M2 | SYSTOLIC BLOOD PRESSURE: 134 MMHG | TEMPERATURE: 96.8 F | HEIGHT: 70 IN | RESPIRATION RATE: 16 BRPM | OXYGEN SATURATION: 99 % | HEART RATE: 77 BPM | DIASTOLIC BLOOD PRESSURE: 88 MMHG | WEIGHT: 188.71 LBS

## 2023-01-24 DIAGNOSIS — R09.81 NASAL CONGESTION: Primary | ICD-10-CM

## 2023-01-24 PROCEDURE — 6360000002 HC RX W HCPCS: Performed by: NURSE ANESTHETIST, CERTIFIED REGISTERED

## 2023-01-24 PROCEDURE — 3600000002 HC SURGERY LEVEL 2 BASE: Performed by: OTOLARYNGOLOGY

## 2023-01-24 PROCEDURE — 2500000003 HC RX 250 WO HCPCS: Performed by: OTOLARYNGOLOGY

## 2023-01-24 PROCEDURE — 2580000003 HC RX 258: Performed by: OTOLARYNGOLOGY

## 2023-01-24 PROCEDURE — 30520 REPAIR OF NASAL SEPTUM: CPT | Performed by: OTOLARYNGOLOGY

## 2023-01-24 PROCEDURE — 2780000010 HC IMPLANT OTHER: Performed by: OTOLARYNGOLOGY

## 2023-01-24 PROCEDURE — 2580000003 HC RX 258: Performed by: ANESTHESIOLOGY

## 2023-01-24 PROCEDURE — 2500000003 HC RX 250 WO HCPCS: Performed by: NURSE ANESTHETIST, CERTIFIED REGISTERED

## 2023-01-24 PROCEDURE — C1776 JOINT DEVICE (IMPLANTABLE): HCPCS | Performed by: OTOLARYNGOLOGY

## 2023-01-24 PROCEDURE — A4217 STERILE WATER/SALINE, 500 ML: HCPCS | Performed by: OTOLARYNGOLOGY

## 2023-01-24 PROCEDURE — 3600000012 HC SURGERY LEVEL 2 ADDTL 15MIN: Performed by: OTOLARYNGOLOGY

## 2023-01-24 PROCEDURE — 6370000000 HC RX 637 (ALT 250 FOR IP): Performed by: OTOLARYNGOLOGY

## 2023-01-24 PROCEDURE — 7100000011 HC PHASE II RECOVERY - ADDTL 15 MIN: Performed by: OTOLARYNGOLOGY

## 2023-01-24 PROCEDURE — 7100000000 HC PACU RECOVERY - FIRST 15 MIN: Performed by: OTOLARYNGOLOGY

## 2023-01-24 PROCEDURE — 3700000000 HC ANESTHESIA ATTENDED CARE: Performed by: OTOLARYNGOLOGY

## 2023-01-24 PROCEDURE — 6370000000 HC RX 637 (ALT 250 FOR IP): Performed by: STUDENT IN AN ORGANIZED HEALTH CARE EDUCATION/TRAINING PROGRAM

## 2023-01-24 PROCEDURE — 30140 RESECT INFERIOR TURBINATE: CPT | Performed by: OTOLARYNGOLOGY

## 2023-01-24 PROCEDURE — 7100000001 HC PACU RECOVERY - ADDTL 15 MIN: Performed by: OTOLARYNGOLOGY

## 2023-01-24 PROCEDURE — 3700000001 HC ADD 15 MINUTES (ANESTHESIA): Performed by: OTOLARYNGOLOGY

## 2023-01-24 PROCEDURE — 2720000010 HC SURG SUPPLY STERILE: Performed by: OTOLARYNGOLOGY

## 2023-01-24 PROCEDURE — 7100000010 HC PHASE II RECOVERY - FIRST 15 MIN: Performed by: OTOLARYNGOLOGY

## 2023-01-24 PROCEDURE — 2709999900 HC NON-CHARGEABLE SUPPLY: Performed by: OTOLARYNGOLOGY

## 2023-01-24 DEVICE — SHEET 1532530 10PK SILICONE 5X5CM 1.57MM: Type: IMPLANTABLE DEVICE | Site: NOSE | Status: FUNCTIONAL

## 2023-01-24 RX ORDER — SODIUM CHLORIDE 9 MG/ML
INJECTION, SOLUTION INTRAVENOUS PRN
Status: DISCONTINUED | OUTPATIENT
Start: 2023-01-24 | End: 2023-01-24 | Stop reason: HOSPADM

## 2023-01-24 RX ORDER — FENTANYL CITRATE 50 UG/ML
INJECTION, SOLUTION INTRAMUSCULAR; INTRAVENOUS PRN
Status: DISCONTINUED | OUTPATIENT
Start: 2023-01-24 | End: 2023-01-24 | Stop reason: SDUPTHER

## 2023-01-24 RX ORDER — PROCHLORPERAZINE EDISYLATE 5 MG/ML
5 INJECTION INTRAMUSCULAR; INTRAVENOUS
Status: DISCONTINUED | OUTPATIENT
Start: 2023-01-24 | End: 2023-01-24 | Stop reason: HOSPADM

## 2023-01-24 RX ORDER — HYDROCODONE BITARTRATE AND ACETAMINOPHEN 5; 325 MG/1; MG/1
2 TABLET ORAL
Status: COMPLETED | OUTPATIENT
Start: 2023-01-24 | End: 2023-01-24

## 2023-01-24 RX ORDER — SODIUM CHLORIDE 0.9 % (FLUSH) 0.9 %
5-40 SYRINGE (ML) INJECTION PRN
Status: DISCONTINUED | OUTPATIENT
Start: 2023-01-24 | End: 2023-01-24 | Stop reason: HOSPADM

## 2023-01-24 RX ORDER — LORAZEPAM 2 MG/ML
0.5 INJECTION INTRAMUSCULAR PRN
Status: DISCONTINUED | OUTPATIENT
Start: 2023-01-24 | End: 2023-01-24 | Stop reason: HOSPADM

## 2023-01-24 RX ORDER — MIDAZOLAM HYDROCHLORIDE 1 MG/ML
INJECTION INTRAMUSCULAR; INTRAVENOUS PRN
Status: DISCONTINUED | OUTPATIENT
Start: 2023-01-24 | End: 2023-01-24 | Stop reason: SDUPTHER

## 2023-01-24 RX ORDER — IPRATROPIUM BROMIDE AND ALBUTEROL SULFATE 2.5; .5 MG/3ML; MG/3ML
1 SOLUTION RESPIRATORY (INHALATION)
Status: DISCONTINUED | OUTPATIENT
Start: 2023-01-24 | End: 2023-01-24 | Stop reason: HOSPADM

## 2023-01-24 RX ORDER — MAGNESIUM HYDROXIDE 1200 MG/15ML
LIQUID ORAL CONTINUOUS PRN
Status: COMPLETED | OUTPATIENT
Start: 2023-01-24 | End: 2023-01-24

## 2023-01-24 RX ORDER — SUCCINYLCHOLINE/SOD CL,ISO/PF 200MG/10ML
SYRINGE (ML) INTRAVENOUS PRN
Status: DISCONTINUED | OUTPATIENT
Start: 2023-01-24 | End: 2023-01-24 | Stop reason: SDUPTHER

## 2023-01-24 RX ORDER — SODIUM CHLORIDE 0.9 % (FLUSH) 0.9 %
5-40 SYRINGE (ML) INJECTION EVERY 12 HOURS SCHEDULED
Status: DISCONTINUED | OUTPATIENT
Start: 2023-01-24 | End: 2023-01-24 | Stop reason: HOSPADM

## 2023-01-24 RX ORDER — DEXAMETHASONE SODIUM PHOSPHATE 4 MG/ML
INJECTION, SOLUTION INTRA-ARTICULAR; INTRALESIONAL; INTRAMUSCULAR; INTRAVENOUS; SOFT TISSUE PRN
Status: DISCONTINUED | OUTPATIENT
Start: 2023-01-24 | End: 2023-01-24 | Stop reason: SDUPTHER

## 2023-01-24 RX ORDER — ONDANSETRON 2 MG/ML
4 INJECTION INTRAMUSCULAR; INTRAVENOUS
Status: DISCONTINUED | OUTPATIENT
Start: 2023-01-24 | End: 2023-01-24 | Stop reason: HOSPADM

## 2023-01-24 RX ORDER — MEPERIDINE HYDROCHLORIDE 25 MG/ML
12.5 INJECTION INTRAMUSCULAR; INTRAVENOUS; SUBCUTANEOUS EVERY 5 MIN PRN
Status: DISCONTINUED | OUTPATIENT
Start: 2023-01-24 | End: 2023-01-24 | Stop reason: HOSPADM

## 2023-01-24 RX ORDER — ECHINACEA PURPUREA EXTRACT 125 MG
1 TABLET ORAL 4 TIMES DAILY
Qty: 1 EACH | Refills: 3 | Status: SHIPPED | OUTPATIENT
Start: 2023-01-24

## 2023-01-24 RX ORDER — LIDOCAINE HYDROCHLORIDE 20 MG/ML
INJECTION, SOLUTION EPIDURAL; INFILTRATION; INTRACAUDAL; PERINEURAL PRN
Status: DISCONTINUED | OUTPATIENT
Start: 2023-01-24 | End: 2023-01-24 | Stop reason: SDUPTHER

## 2023-01-24 RX ORDER — AMOXICILLIN AND CLAVULANATE POTASSIUM 875; 125 MG/1; MG/1
1 TABLET, FILM COATED ORAL 2 TIMES DAILY
Qty: 14 TABLET | Refills: 0 | Status: SHIPPED | OUTPATIENT
Start: 2023-01-24 | End: 2023-01-31

## 2023-01-24 RX ORDER — ROCURONIUM BROMIDE 10 MG/ML
INJECTION, SOLUTION INTRAVENOUS PRN
Status: DISCONTINUED | OUTPATIENT
Start: 2023-01-24 | End: 2023-01-24 | Stop reason: SDUPTHER

## 2023-01-24 RX ORDER — OXYMETAZOLINE HYDROCHLORIDE 0.05 G/100ML
SPRAY NASAL
Status: COMPLETED | OUTPATIENT
Start: 2023-01-24 | End: 2023-01-24

## 2023-01-24 RX ORDER — HYDRALAZINE HYDROCHLORIDE 20 MG/ML
10 INJECTION INTRAMUSCULAR; INTRAVENOUS
Status: DISCONTINUED | OUTPATIENT
Start: 2023-01-24 | End: 2023-01-24 | Stop reason: HOSPADM

## 2023-01-24 RX ORDER — LIDOCAINE HYDROCHLORIDE AND EPINEPHRINE 10; 10 MG/ML; UG/ML
INJECTION, SOLUTION INFILTRATION; PERINEURAL
Status: COMPLETED | OUTPATIENT
Start: 2023-01-24 | End: 2023-01-24

## 2023-01-24 RX ORDER — LABETALOL HYDROCHLORIDE 5 MG/ML
10 INJECTION, SOLUTION INTRAVENOUS
Status: DISCONTINUED | OUTPATIENT
Start: 2023-01-24 | End: 2023-01-24 | Stop reason: HOSPADM

## 2023-01-24 RX ORDER — ONDANSETRON 2 MG/ML
INJECTION INTRAMUSCULAR; INTRAVENOUS PRN
Status: DISCONTINUED | OUTPATIENT
Start: 2023-01-24 | End: 2023-01-24 | Stop reason: SDUPTHER

## 2023-01-24 RX ORDER — HYDROCODONE BITARTRATE AND ACETAMINOPHEN 5; 325 MG/1; MG/1
1 TABLET ORAL EVERY 6 HOURS PRN
Qty: 20 TABLET | Refills: 0 | Status: SHIPPED | OUTPATIENT
Start: 2023-01-24 | End: 2023-01-29

## 2023-01-24 RX ORDER — PROPOFOL 10 MG/ML
INJECTION, EMULSION INTRAVENOUS PRN
Status: DISCONTINUED | OUTPATIENT
Start: 2023-01-24 | End: 2023-01-24 | Stop reason: SDUPTHER

## 2023-01-24 RX ORDER — HYDROCODONE BITARTRATE AND ACETAMINOPHEN 5; 325 MG/1; MG/1
1 TABLET ORAL
Status: COMPLETED | OUTPATIENT
Start: 2023-01-24 | End: 2023-01-24

## 2023-01-24 RX ADMIN — FENTANYL CITRATE 50 MCG: 50 INJECTION INTRAMUSCULAR; INTRAVENOUS at 09:34

## 2023-01-24 RX ADMIN — PROPOFOL 200 MG: 10 INJECTION, EMULSION INTRAVENOUS at 09:36

## 2023-01-24 RX ADMIN — ONDANSETRON 4 MG: 2 INJECTION INTRAMUSCULAR; INTRAVENOUS at 09:48

## 2023-01-24 RX ADMIN — Medication 140 MG: at 09:38

## 2023-01-24 RX ADMIN — DEXAMETHASONE SODIUM PHOSPHATE 8 MG: 4 INJECTION, SOLUTION INTRAMUSCULAR; INTRAVENOUS at 09:48

## 2023-01-24 RX ADMIN — SUGAMMADEX 100 MG: 100 INJECTION, SOLUTION INTRAVENOUS at 10:24

## 2023-01-24 RX ADMIN — HYDROCODONE BITARTRATE AND ACETAMINOPHEN 1 TABLET: 5; 325 TABLET ORAL at 12:15

## 2023-01-24 RX ADMIN — SUGAMMADEX 50 MG: 100 INJECTION, SOLUTION INTRAVENOUS at 10:22

## 2023-01-24 RX ADMIN — SODIUM CHLORIDE: 9 INJECTION, SOLUTION INTRAVENOUS at 09:34

## 2023-01-24 RX ADMIN — MIDAZOLAM 2 MG: 1 INJECTION INTRAMUSCULAR; INTRAVENOUS at 09:32

## 2023-01-24 RX ADMIN — LIDOCAINE HYDROCHLORIDE 80 MG: 20 INJECTION, SOLUTION EPIDURAL; INFILTRATION; INTRACAUDAL; PERINEURAL at 09:36

## 2023-01-24 RX ADMIN — LIDOCAINE HYDROCHLORIDE 20 MG: 20 INJECTION, SOLUTION EPIDURAL; INFILTRATION; INTRACAUDAL; PERINEURAL at 10:24

## 2023-01-24 RX ADMIN — FENTANYL CITRATE 50 MCG: 50 INJECTION INTRAMUSCULAR; INTRAVENOUS at 09:49

## 2023-01-24 RX ADMIN — ROCURONIUM BROMIDE 10 MG: 100 INJECTION, SOLUTION INTRAVENOUS at 09:36

## 2023-01-24 RX ADMIN — PROPOFOL 30 MG: 10 INJECTION, EMULSION INTRAVENOUS at 09:38

## 2023-01-24 RX ADMIN — ROCURONIUM BROMIDE 40 MG: 100 INJECTION, SOLUTION INTRAVENOUS at 09:43

## 2023-01-24 RX ADMIN — PROPOFOL 30 MG: 10 INJECTION, EMULSION INTRAVENOUS at 10:24

## 2023-01-24 RX ADMIN — SUGAMMADEX 50 MG: 100 INJECTION, SOLUTION INTRAVENOUS at 10:21

## 2023-01-24 ASSESSMENT — PAIN - FUNCTIONAL ASSESSMENT
PAIN_FUNCTIONAL_ASSESSMENT: PREVENTS OR INTERFERES SOME ACTIVE ACTIVITIES AND ADLS
PAIN_FUNCTIONAL_ASSESSMENT: 0-10
PAIN_FUNCTIONAL_ASSESSMENT: ACTIVITIES ARE NOT PREVENTED
PAIN_FUNCTIONAL_ASSESSMENT: ACTIVITIES ARE NOT PREVENTED

## 2023-01-24 ASSESSMENT — PAIN DESCRIPTION - PAIN TYPE
TYPE: SURGICAL PAIN
TYPE: SURGICAL PAIN

## 2023-01-24 ASSESSMENT — PAIN DESCRIPTION - DESCRIPTORS
DESCRIPTORS: ACHING
DESCRIPTORS: SORE
DESCRIPTORS: SORE

## 2023-01-24 ASSESSMENT — PAIN SCALES - GENERAL
PAINLEVEL_OUTOF10: 2
PAINLEVEL_OUTOF10: 5
PAINLEVEL_OUTOF10: 2

## 2023-01-24 ASSESSMENT — PAIN DESCRIPTION - ONSET
ONSET: ON-GOING
ONSET: ON-GOING

## 2023-01-24 ASSESSMENT — PAIN DESCRIPTION - FREQUENCY
FREQUENCY: CONTINUOUS
FREQUENCY: CONTINUOUS

## 2023-01-24 ASSESSMENT — LIFESTYLE VARIABLES: SMOKING_STATUS: 0

## 2023-01-24 ASSESSMENT — PAIN DESCRIPTION - LOCATION
LOCATION: FACE;NOSE
LOCATION: FACE;NOSE

## 2023-01-24 NOTE — H&P
I have reviewed this patient's history. There have been no significant changes. The patient understands the risks of a septoplasty and inferior turbinate reduction including nosebleed, septal perforation and ongoing congestion. He has agreed to proceed.

## 2023-01-24 NOTE — PROGRESS NOTES
Pt passed nickel size clot from right nare and moderate sanguinous drainage. Changed mustache dressing before discharge. Pt walked to bathroom and voided. Gave pt an ice pack for face and nose. Wheeled pt to sister's car for discharge.

## 2023-01-24 NOTE — PROGRESS NOTES
Patient opens eyes to name, oral airway removed. Resp easy unlabored on room air O2 with SaO2 100%. Small amount of pink bloody drainage noted from nares. Mustache dressing placed. Patient expectorating blood tinged sputum from mouth. Patient denies C/O pain or nausea. VSS. IV patent.

## 2023-01-24 NOTE — DISCHARGE INSTRUCTIONS
Discharge Instructions     Steps to Take  Home Care   While your sinuses are healing:   Do not blow your nose  You may have bloody discharge from your nose. Create a drip pad using rolls of gauze. Hold the roll under your nose to soak up any discharge. Avoid air pollutants like dust and smoke. Physical Activity   During your recovery:   Light activity (no lifting more than 15 pounds) for 1 week  Medications   Dr. Abbie Ramsey   Take antibiotic while splints are in place  USE saline spray 4 times a day    Follow these general medication guidelines:   Take your medication as directed. Do not change the amount or schedule. Do not share your medication with anyone. Medications can be dangerous when mixed. Talk to your doctor if you are taking more than one medication, including over-the-counter products and supplements. If you had to stop medications before surgery, ask your doctor when you can start again. Follow-up  1 week  Call Your Doctor If Any of the Following Occur ([phone])  Contact your doctor if your recovery is not progressing as expected or you develop complications, such as:  Signs of infection, including fever and chills  Pain that you cannot control with the medications that you have been given  Redness, swelling, increasing pain, excessive bleeding, or discharge from the nose  Lightheadedness  Nausea and vomiting  Vomiting blood or material that looks like coffee grounds  Bruising around the eye(s)  Swelling of the eye(s)  Changes in vision  A headache that lasts longer than 2 days after surgery    If you think you have an emergency, call for medical help right away.

## 2023-01-24 NOTE — ANESTHESIA POSTPROCEDURE EVALUATION
Department of Anesthesiology  Postprocedure Note    Patient: Angelique Gamez  MRN: [id number]  YOB: 1980  Date of evaluation: 1/24/2023      Procedure Summary     Date: 01/24/23 Room / Location: [address]    Anesthesia Start: 6814 Anesthesia Stop: 7775    Procedure: SEPTOPLASTY, INFERIOR TURBINATE REDUCTION (Nose) Diagnosis:       Nasal congestion      Deviated septum      Hypertrophy of inferior nasal turbinate      Rhinitis medicamentosa      (NASAL CONGESTION, DEVIATED SEPTUM, HYPERTROPHY OF INFERIOR NASAL TURBINATE, RHINITIS MEDICAMENTOSA)    Surgeons: Lionel Cortes MD Responsible Provider: Jesús Gatica MD    Anesthesia Type: general ASA Status: 2          Anesthesia Type: General     Marlin Phase I: Marlin Score: 10    Marlin Phase II:        Anesthesia Post Evaluation    Patient location during evaluation: PACU  Patient participation: complete - patient participated  Level of consciousness: awake  Airway patency: patent  Nausea & Vomiting: no nausea and no vomiting  Complications: no  Cardiovascular status: hemodynamically stable and blood pressure returned to baseline  Respiratory status: spontaneous ventilation and nonlabored ventilation  Hydration status: stable  Comments: Oral airway removed shortly after arrival to PACU. Mr. Rachid Henry was seen resting following procedure. Small volume of blood discharge noted from nares. Instructed not to blow nose despite urge, which is patient chief complaint at this time. Anticipate return to [facility] for planned discharge home. No acute concerns at this time.

## 2023-01-24 NOTE — PROGRESS NOTES
Pt arrived to phase 2. Vss and assessment complete. Rating pain 2/10. Gauze dressing changed. Clean dry and intact. Script sent to [facility]. Sister to bedside. Tolerating PO.  Will monitor

## 2023-01-24 NOTE — PROGRESS NOTES
Mustache dressing changed  x 2 of clear blood tinged drainage. Patient denies C/O pain or nausea. VSS.

## 2023-01-24 NOTE — PROGRESS NOTES
Pt tolerates PO and sitting up in chair. Pt has moderate sanguinous drainage on mustache dressing. changed mustache dressing per pt request. VSS. Verified prescription with pt's name and . Discharge instructions reviewed with pt and sister. Both express an understanding of instructions. Pt dressing in chair.

## 2023-01-24 NOTE — OP NOTE
[address] [facility] SURGERY  OPERATIVE REPORT    Patient Name: Francisco Hawley  YOB: 1980  Medical Record Number:  [id number]  Billing Number:  [id number]  Date of Procedure: [unfilled]  Time: 3839    Pre Operative Diagnoses: nasal congestion  Deviated septum  Inferior turbinate hypertrophy      Post Operative Diagnoses:    same             Procedure:  septoplasty (19148)  Inferior turbinate reduction (93161-02)       Surgeon: Mirna Newman MD    OR Staff/ Assistant:  Circulator: Maddie Thomas RN  Scrub Person First: Candace Botello    Anesthesia:  General anesthesia. Findings:  1) rightward septal deviation  2. Bilateral inferior turbinate hypertrophy    Indications: This is a 43 y.o. male with chronic nasal congestion secondary to a deviated septum and large turbinates. He is here for septoplasty and turbinate reduction. Risks and benefits discussed with the patient including alternate treatment options, Informed consent was obtained, the patient elected to proceed with the planned procedure. DETAILS OF PROCEDURE(S):   The patient was brought to the operating room placed in supine position operating table. He underwent uncomplicated general anesthesia with endotracheal intubation. The head of bed was rotated 180 degrees. 10 mL of 1% lidocaine with epinephrine was injected into the septum and turbinates. He was prepped and draped in a sterile fashion. A left-sided hemitransfixion incision was made. A left-sided mucoperichondrial flap was raised. An L-shaped incision was then made into the quadrangular cartilage. The contralateral mucoperichondrial flap was raised. The quadrangular cartilage was disarticulated from the vomer and maxillary crest.  This cartilage was removed leaving at least 1.5 cm anteriorly and dorsally. Maxillary crest was then removed as well as it was obstructing some of the inferior right nasal cavity.   At this time the septum was very straight. Some of the quadrangular cartilage that I removed was freshened and placed back between the mucoperichondrial flaps. The mucoperichondrial flaps were reapposed using a quilting 4-0 plain gut suture. The hemitransfixion incision was closed with simple interrupted 4-0 chromic sutures. Next incision was made into the anterior aspect of each inferior turbinate. A submucosal pocket was formed. A microdebrider was then used to perform a submucosal reduction. Some of the anterior turbinate bone was then removed. The inferior turbinates were outfractured. Neck Silastic splints were fashioned and placed on each side of the septum. These were secured with a 3-0 Prolene suture. The head of bed was then rotated back 180 degrees. The patient was allowed to awaken, extubated and taken recovery in stable condition. I attest that I was present for and did the entire procedure myself. Estimated Blood Loss: 25 mL                    Complications: There were no complications.     Kristina Marcos MD

## 2023-01-24 NOTE — PROGRESS NOTES
Patient admitted to PACU from OR. Patient asleep with oral airway intact. Resp easy unlabored on 4L NC with SaO2 99%. Silicone sheets intact to nares per OR RN. IV patent to right forearm. VSS.

## 2023-02-01 ENCOUNTER — OFFICE VISIT (OUTPATIENT)
Dept: ENT CLINIC | Age: 43
End: 2023-02-01

## 2023-02-01 VITALS — WEIGHT: 178 LBS | BODY MASS INDEX: 25.48 KG/M2 | HEIGHT: 70 IN

## 2023-02-01 DIAGNOSIS — R09.81 NASAL CONGESTION: Primary | ICD-10-CM

## 2023-02-01 PROCEDURE — 99999 PR OFFICE/OUTPT VISIT,PROCEDURE ONLY: CPT | Performed by: OTOLARYNGOLOGY

## 2023-02-01 NOTE — PROGRESS NOTES
Patient is following up from his septoplasty and turbinate reduction with that we did last week. Overall doing okay other than not be able to breathe out of his nose    Exam  Bilateral Silastic splints removed. Septum is relatively midline. Anterior aspect of the inferior turbinates are well reduced. A little bit of swelling in the posterior aspect of the inferior turbinates. Incisions are all healing well. Plan  Patient appears to be healing very well from surgery. I told him to restart some Flonase. Avoid Afrin. I plan on seeing him in 1 month.

## 2023-02-02 ENCOUNTER — PATIENT MESSAGE (OUTPATIENT)
Dept: ENT CLINIC | Age: 43
End: 2023-02-02

## 2023-02-02 RX ORDER — FLUTICASONE PROPIONATE 50 MCG
1 SPRAY, SUSPENSION (ML) NASAL DAILY
Qty: 32 G | Refills: 1 | Status: SHIPPED | OUTPATIENT
Start: 2023-02-02

## 2023-02-17 ENCOUNTER — OFFICE VISIT (OUTPATIENT)
Dept: ORTHOPEDIC SURGERY | Age: 43
End: 2023-02-17

## 2023-02-17 VITALS — WEIGHT: 189 LBS | HEIGHT: 70 IN | BODY MASS INDEX: 27.06 KG/M2

## 2023-02-17 DIAGNOSIS — M19.012 ARTHRITIS OF LEFT ACROMIOCLAVICULAR JOINT: ICD-10-CM

## 2023-02-17 DIAGNOSIS — M75.82 ROTATOR CUFF TENDONITIS, LEFT: Primary | ICD-10-CM

## 2023-02-17 DIAGNOSIS — M75.22 BICEPS TENDONITIS, LEFT: ICD-10-CM

## 2023-02-17 RX ORDER — HYDROCODONE BITARTRATE AND ACETAMINOPHEN 5; 325 MG/1; MG/1
1 TABLET ORAL
Qty: 15 TABLET | Refills: 0 | Status: SHIPPED | OUTPATIENT
Start: 2023-02-17 | End: 2023-02-24

## 2023-02-17 RX ORDER — NAPROXEN SODIUM 220 MG
220 TABLET ORAL 2 TIMES DAILY WITH MEALS
COMMUNITY

## 2023-02-17 RX ORDER — METHYLPREDNISOLONE 4 MG/1
TABLET ORAL
Qty: 1 KIT | Refills: 0 | Status: SHIPPED | OUTPATIENT
Start: 2023-02-17 | End: 2023-02-23

## 2023-02-17 NOTE — PROGRESS NOTES
Lisandra San  [id number]  February 17, 2023    Chief Complaint   Patient presents with    Follow-up     Left shoulder       History: The patient is a 45-year-old gentleman who is here for evaluation of his left shoulder. The patient did receive a left shoulder injection back in February 2022 and responded well. He was doing rather well up until a week ago. He began having moderate to severe left shoulder pain. He does workout on a regular basis. He does have pain with overhead activities. He has difficulty sleeping on his left side. He continues to have moderate left shoulder pain. He has not had any recent injury. He does do a great deal of weight lifting. The patient has an extensive history with regards to his neck. He had C 2 to C 6 posterior cervical fusion back in 2014. He reports a relatively uneventful recovery. 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 The patient's  past medical history, medications, allergies,  family history, social history, and have been reviewed, and dated and are recorded in the chart. Pertinent items are noted in HPI. Review of systems reviewed from Pertinent History Form dated on 9/21/21 and available in the posterior cervical fusion back in 2014. Chart under the Media tab. Vitals:  Ht 5' 10\" (1.778 m)   Wt 189 lb (85.7 kg)   BMI 27.12 kg/m²     Physical: Physical: The patient presents today in no acute distress. He is alert and oriented to person, place and time. He displays appropriate mood and affect. He is well dressed, nourished and  groomed. He has a normal affect. Examination of the neck reveals no evidence of tenderness. Spurling's sign is negative. Active range of motion of the left shoulder is: 175 degrees abduction, 175 degrees forward flexion, 45 degrees of external rotation and internal rotation to L1.  Passive range of motion of the left shoulder is: 180 degrees abduction, 180 degrees forward flexion, 50 degrees of external rotation and internal rotation to T11. Active and passive range of motion of the opposite shoulder is full. Examination of the left shoulder reveals positive Neer and Barclay' impingement signs. There is mild subacromial crepitus with range of motion. Drop arm test is negative today. Speed's test does cause significant pain. There is moderate tenderness over the Bicipital groove. He  does have tenderness over the [facility] joint. Cross body adduction test is positive. He has moderate tenderness over the subacromial space. There is no evidence of scapular winging. Lift off sign is negative. There is no evidence of atrophy. External rotation strength is full. There are no skin lesions, cellulitis, or extreme edema in the upper extremities. Sensation is intact to axillary, median, radial, and ulnar nerves bilaterally. The patient has warm and well-perfused bilateral upper extremities with brisk capillary refill. Radial and ulnar pulses are palpable and 2+ bilaterally. X-rays: 4 views of the left shoulder obtained in the office today were extensively reviewed. There is moderate AC joint arthritis. There are no lytic or blastic lesions within the bone. Impression: Left shoulder biceps tendinitis #2 left shoulder AC joint arthritis    Plan: At this time, we did go over all treatment options at length. We will give the patient a Medrol Dosepak for the left shoulder. He has dealt with chronic rotator cuff tendinitis in the remote past.  He was encouraged to modify his activities. He will avoid overhead lifting, pushing and pulling. He was instructed to stop lifting weights for the next few weeks. The patient may resume the ibuprofen after the steroids are completed.   He was given a one-time prescription for Norco.

## 2023-02-24 ENCOUNTER — PATIENT MESSAGE (OUTPATIENT)
Dept: FAMILY MEDICINE CLINIC | Age: 43
End: 2023-02-24

## 2023-02-24 DIAGNOSIS — M72.2 PLANTAR FASCIITIS OF RIGHT FOOT: Primary | ICD-10-CM

## 2023-02-24 RX ORDER — TRAMADOL HYDROCHLORIDE 50 MG/1
50 TABLET ORAL EVERY 6 HOURS PRN
Qty: 20 TABLET | Refills: 0 | Status: SHIPPED | OUTPATIENT
Start: 2023-02-24 | End: 2023-04-14 | Stop reason: SDUPTHER

## 2023-02-24 NOTE — TELEPHONE ENCOUNTER
It looks like he just got pain medicine from ortho 1 week ago. Will fill #20 tramadol tabs but will need follow up with me, med agreement/ uds prior to refills.   oarrs reviewed and results c/w rx'd meds

## 2023-03-02 NOTE — PATIENT INSTRUCTIONS
You may receive a survey regarding the care you received during your visit. Your input is valuable to us. We encourage you to complete and return your survey. We hope you will choose us in the future for your healthcare needs. GENERAL OFFICE POLICIES      Telephone Calls: Messages will be answered within 1-2 business days, unless the provider is out of the office. If it is urgent a covering provider will answer. (this does not include Medication refills). MyChart: We recommend all patients sign up for Fewzionhart. Through this portal you can see your lab results, request refills, schedule appointments, pay your bill and send messages to the office. Fewzionhart messages will be answered within 1-2 business days unless the provider is out of the office. For urgent matters, please call the office. Appointments:  All appointments must be scheduled. We ask all patients to schedule their next follow up appointment before they leave the office to make sure you will be able to be seen before you run out of medications. 24 hours notice is required to cancel or reschedule an appointment to avoid being marked as a no show. You may be dismissed from the practice after 3 no shows. LATE for Appointment: If you are 15 or more minutes late for your appointment, you may be asked to reschedule. MA/LAB APPTS: Must be scheduled, cannot accept walk in lab visits. We only draw labs for patients established in our office. We only do injections for medications ordered by our office. Acute Sick Visits:  Nothing other than acute complaint will be addressed at this visit. TRADITIONAL MEDICARE  DOES NOT COVER PHYSICALS  MEDICARE WELLNESS VISITS: These are NOT physicals but the free annual visit offered by Medicare to discuss wellness issues. Medication refills, checkups, etc. will not be addressed during this visit.   Medication Refills: Refills are handled electronically so please contact your pharmacy for medication refills even if current refills have been exhausted. If you are on a controlled medication you will be referred to a specialist (pain specialist, psychiatry, etc). Forms: There is a $35 fee to fill out FMLA/Disability paperwork, payable at time of . Instead of the fee, you can choose to have the paperwork filled out during a separate office visit that is for filling out the paperwork only. Medication Samples: This office does not carry medication samples. If you need assistance in getting your medications, then please let the medical assistant know so they can help you sign up for a drug assistance program that can help get medications at a reduced cost or even free (if you qualify). Workman's Comp Claims: We do not handle workman's comp cases or claims. You will need to go to an urgent care to be seen or to whomever your employer uses.   General - Any abusive/rude behavior toward staff/providers may be cause for dismissal.

## 2023-03-03 ENCOUNTER — OFFICE VISIT (OUTPATIENT)
Dept: FAMILY MEDICINE CLINIC | Age: 43
End: 2023-03-03
Payer: COMMERCIAL

## 2023-03-03 ENCOUNTER — OFFICE VISIT (OUTPATIENT)
Dept: ENT CLINIC | Age: 43
End: 2023-03-03

## 2023-03-03 VITALS
HEART RATE: 78 BPM | DIASTOLIC BLOOD PRESSURE: 72 MMHG | OXYGEN SATURATION: 96 % | BODY MASS INDEX: 26.11 KG/M2 | SYSTOLIC BLOOD PRESSURE: 112 MMHG | HEIGHT: 70 IN | WEIGHT: 182.4 LBS

## 2023-03-03 VITALS — BODY MASS INDEX: 25.62 KG/M2 | HEIGHT: 70 IN | WEIGHT: 179 LBS

## 2023-03-03 DIAGNOSIS — J30.89 ALLERGIC RHINITIS DUE TO OTHER ALLERGIC TRIGGER, UNSPECIFIED SEASONALITY: ICD-10-CM

## 2023-03-03 DIAGNOSIS — S39.012A STRAIN OF LUMBAR REGION, INITIAL ENCOUNTER: Primary | ICD-10-CM

## 2023-03-03 DIAGNOSIS — R09.81 NASAL CONGESTION: Primary | ICD-10-CM

## 2023-03-03 PROCEDURE — G8427 DOCREV CUR MEDS BY ELIG CLIN: HCPCS | Performed by: NURSE PRACTITIONER

## 2023-03-03 PROCEDURE — G8484 FLU IMMUNIZE NO ADMIN: HCPCS | Performed by: NURSE PRACTITIONER

## 2023-03-03 PROCEDURE — 1036F TOBACCO NON-USER: CPT | Performed by: NURSE PRACTITIONER

## 2023-03-03 PROCEDURE — 99213 OFFICE O/P EST LOW 20 MIN: CPT | Performed by: NURSE PRACTITIONER

## 2023-03-03 PROCEDURE — G8419 CALC BMI OUT NRM PARAM NOF/U: HCPCS | Performed by: NURSE PRACTITIONER

## 2023-03-03 RX ORDER — TIZANIDINE 4 MG/1
4 TABLET ORAL 3 TIMES DAILY PRN
Qty: 30 TABLET | Refills: 0 | Status: SHIPPED | OUTPATIENT
Start: 2023-03-03

## 2023-03-03 RX ORDER — METHYLPREDNISOLONE 4 MG/1
TABLET ORAL
Qty: 1 KIT | Refills: 0 | Status: SHIPPED | OUTPATIENT
Start: 2023-03-03 | End: 2023-03-09

## 2023-03-03 SDOH — ECONOMIC STABILITY: FOOD INSECURITY: WITHIN THE PAST 12 MONTHS, YOU WORRIED THAT YOUR FOOD WOULD RUN OUT BEFORE YOU GOT MONEY TO BUY MORE.: NEVER TRUE

## 2023-03-03 SDOH — ECONOMIC STABILITY: INCOME INSECURITY: HOW HARD IS IT FOR YOU TO PAY FOR THE VERY BASICS LIKE FOOD, HOUSING, MEDICAL CARE, AND HEATING?: NOT HARD AT ALL

## 2023-03-03 SDOH — ECONOMIC STABILITY: FOOD INSECURITY: WITHIN THE PAST 12 MONTHS, THE FOOD YOU BOUGHT JUST DIDN'T LAST AND YOU DIDN'T HAVE MONEY TO GET MORE.: NEVER TRUE

## 2023-03-03 SDOH — ECONOMIC STABILITY: HOUSING INSECURITY
IN THE LAST 12 MONTHS, WAS THERE A TIME WHEN YOU DID NOT HAVE A STEADY PLACE TO SLEEP OR SLEPT IN A SHELTER (INCLUDING NOW)?: NO

## 2023-03-03 ASSESSMENT — ENCOUNTER SYMPTOMS
WHEEZING: 0
BACK PAIN: 1
SHORTNESS OF BREATH: 0

## 2023-03-03 NOTE — PROGRESS NOTES
Penny Mirza (:  1980) is a 43 y.o. male,Established patient, here for evaluation of the following chief complaint(s):  Rib Pain (Pt c/o left sided back and side pain x 2 weeks, hurts to lay on his left side, near his rib cage area )      ASSESSMENT/PLAN:  1. Strain of lumbar region, initial encounter  -Presentation is consistent with strain. Sending a Medrol Dosepak and tizanidine to the pharmacy. Educated on the medication uses as well as side effects. The patient did recently get another Medrol Dosepak. Emphasized the importance of trying to avoid Medrol dose packs on a frequent basis if possible but can do this 1 time. Follow-up as needed if no improvement. -     methylPREDNISolone (MEDROL DOSEPACK) 4 MG tablet; Take by mouth., Disp-1 kit, R-0Normal  -     tiZANidine (ZANAFLEX) 4 MG tablet; Take 1 tablet by mouth 3 times daily as needed (Muscle spasm), Disp-30 tablet, R-0Normal    Return if symptoms worsen or fail to improve. SUBJECTIVE/OBJECTIVE:  CHRISS Smith presents today for evaluation of a possible strain. He states he has had some pain to his left side for about 2 weeks. The pain radiates around to his back and up toward his ribs. He states that if he goes to stretch his arm out the pain worsens. If he tries to stretch the area, it will sometimes feel locked. He does work out frequently. States he has increased his weight a little bit, but nothing excessive. Denies any known trauma or injury. Believes it is a pull. He does note they followed with Ortho recently for his left shoulder. Was given a Medrol Dosepak at that time. He states that the pain to his side and his back developed after this time. Review of Systems   Constitutional:  Negative for chills and fever. Respiratory:  Negative for shortness of breath and wheezing. Cardiovascular:  Negative for chest pain, palpitations and leg swelling. Musculoskeletal:  Positive for back pain and myalgias.  Negative for gait problem and joint swelling. Neurological:  Negative for dizziness, weakness, light-headedness, numbness and headaches. Psychiatric/Behavioral:  Negative for decreased concentration, dysphoric mood, self-injury, sleep disturbance and suicidal ideas. The patient is not nervous/anxious. Physical Exam  Constitutional:       General: He is not in acute distress. Appearance: Normal appearance. He is normal weight. Cardiovascular:      Pulses: Normal pulses. Heart sounds: Normal heart sounds. No murmur heard. No gallop. Pulmonary:      Effort: Pulmonary effort is normal.      Breath sounds: Normal breath sounds. No wheezing. Musculoskeletal:         General: Tenderness present. No swelling. Normal range of motion. Comments: Tenderness and tension to the left flank extending into the back with spasm   Neurological:      Mental Status: He is alert and oriented to person, place, and time. Psychiatric:         Mood and Affect: Mood normal.         Behavior: Behavior normal.         Thought Content: Thought content normal.         Judgment: Judgment normal.             This dictation was generated by voice recognition computer software. Although all attempts are made to edit the dictation for accuracy, there may be errors in the transcription that are not intended. An electronic signature was used to authenticate this note.     --Bang Zepeda, APRN - CNP

## 2023-03-03 NOTE — PROGRESS NOTES
The patient is following up from his septoplasty and turbinate reduction that I did on February 1, 2023. He says that he is doing very well. He is breathing significantly better. It has improved his sleep. He is using Flonase for allergies. He does feel as though he is having a fair amount of allergy symptoms    Exam  Anterior rhinoscopy shows very slight residual rightward septal deviation, bilateral well reduced turbinates. Bilateral nasal cavity seems to be very patent    Plan  Patient recovered very well from surgery. He is pleased with the results.   He is still having significant allergy symptoms so I have referred him for further evaluation with an allergist

## 2023-04-21 ENCOUNTER — TELEPHONE (OUTPATIENT)
Dept: FAMILY MEDICINE CLINIC | Age: 43
End: 2023-04-21

## 2023-04-21 DIAGNOSIS — R73.9 HYPERGLYCEMIA: ICD-10-CM

## 2023-04-21 DIAGNOSIS — Z13.220 LIPID SCREENING: ICD-10-CM

## 2023-04-21 DIAGNOSIS — R74.01 ELEVATED ALT MEASUREMENT: Primary | ICD-10-CM

## 2023-04-21 NOTE — TELEPHONE ENCOUNTER
PT CAME IN FOR LABWORK TODAY BUT DID NOT FAST. WE CHANGED HIS APPT TO Friday 4/28/2023. I HAD TO CANCEL THE ORIGINAL LABS BECAUSE THEY WERE ALREADY RELEASED. RE-ADDED THE SAME LABS.  [facility]

## 2023-04-28 ENCOUNTER — PATIENT MESSAGE (OUTPATIENT)
Dept: FAMILY MEDICINE CLINIC | Age: 43
End: 2023-04-28

## 2023-04-28 ENCOUNTER — NURSE ONLY (OUTPATIENT)
Dept: FAMILY MEDICINE CLINIC | Age: 43
End: 2023-04-28
Payer: COMMERCIAL

## 2023-04-28 DIAGNOSIS — Z13.220 LIPID SCREENING: ICD-10-CM

## 2023-04-28 DIAGNOSIS — R73.9 HYPERGLYCEMIA: ICD-10-CM

## 2023-04-28 DIAGNOSIS — R74.01 ELEVATED ALT MEASUREMENT: ICD-10-CM

## 2023-04-28 LAB
ALBUMIN SERPL-MCNC: 4.7 G/DL (ref 3.4–5)
ALBUMIN/GLOB SERPL: 2.5 {RATIO} (ref 1.1–2.2)
ALP SERPL-CCNC: 49 U/L (ref 40–129)
ALT SERPL-CCNC: 48 U/L (ref 10–40)
ANION GAP SERPL CALCULATED.3IONS-SCNC: 10 MMOL/L (ref 3–16)
AST SERPL-CCNC: 23 U/L (ref 15–37)
BILIRUB SERPL-MCNC: 0.3 MG/DL (ref 0–1)
BUN SERPL-MCNC: 21 MG/DL (ref 7–20)
CALCIUM SERPL-MCNC: 9.5 MG/DL (ref 8.3–10.6)
CHLORIDE SERPL-SCNC: 100 MMOL/L (ref 99–110)
CHOLEST SERPL-MCNC: 159 MG/DL (ref 0–199)
CO2 SERPL-SCNC: 24 MMOL/L (ref 21–32)
CREAT SERPL-MCNC: 0.9 MG/DL (ref 0.9–1.3)
GFR SERPLBLD CREATININE-BSD FMLA CKD-EPI: >60 ML/MIN/{1.73_M2}
GGT SERPL-CCNC: 10 U/L (ref 8–61)
GLUCOSE SERPL-MCNC: 97 MG/DL (ref 70–99)
HDLC SERPL-MCNC: 17 MG/DL (ref 40–60)
LDLC SERPL CALC-MCNC: 125 MG/DL
POTASSIUM SERPL-SCNC: 4.9 MMOL/L (ref 3.5–5.1)
PROT SERPL-MCNC: 6.6 G/DL (ref 6.4–8.2)
SODIUM SERPL-SCNC: 134 MMOL/L (ref 136–145)
TRIGL SERPL-MCNC: 85 MG/DL (ref 0–150)
VLDLC SERPL CALC-MCNC: 17 MG/DL

## 2023-04-28 PROCEDURE — 36415 COLL VENOUS BLD VENIPUNCTURE: CPT | Performed by: FAMILY MEDICINE

## 2023-04-29 LAB
EST. AVERAGE GLUCOSE BLD GHB EST-MCNC: 96.8 MG/DL
HBA1C MFR BLD: 5 %

## 2023-04-30 DIAGNOSIS — R74.01 ELEVATED ALT MEASUREMENT: Primary | ICD-10-CM

## 2023-05-01 NOTE — TELEPHONE ENCOUNTER
From: Corey Cuevas  To: Dr. Barth Ply: 4/28/2023 8:52 PM EDT  Subject: Blood work results     I noticed a few flags in my blood work results.  I was just wondering what all of these mean

## 2023-05-05 ENCOUNTER — HOSPITAL ENCOUNTER (OUTPATIENT)
Dept: ULTRASOUND IMAGING | Age: 43
Discharge: HOME | End: 2023-05-05
Payer: COMMERCIAL

## 2023-05-05 DIAGNOSIS — R74.01 ELEVATED ALT MEASUREMENT: ICD-10-CM

## 2023-05-05 PROCEDURE — 76705 ECHO EXAM OF ABDOMEN: CPT

## 2023-05-09 ENCOUNTER — TELEPHONE (OUTPATIENT)
Dept: FAMILY MEDICINE CLINIC | Age: 43
End: 2023-05-09

## 2023-07-12 ENCOUNTER — PATIENT MESSAGE (OUTPATIENT)
Dept: FAMILY MEDICINE CLINIC | Age: 43
End: 2023-07-12

## 2023-07-12 NOTE — TELEPHONE ENCOUNTER
From: Anastacia Cuevas  To: Dr. Tasha Michel: 7/12/2023 8:14 AM EDT  Subject: Refill     Would it be possible to get a refill for tramadol

## 2023-07-16 DIAGNOSIS — M72.2 PLANTAR FASCIITIS OF RIGHT FOOT: ICD-10-CM

## 2023-07-17 RX ORDER — TRAMADOL HYDROCHLORIDE 50 MG/1
TABLET ORAL
Qty: 20 TABLET | OUTPATIENT
Start: 2023-07-17

## 2023-07-17 NOTE — TELEPHONE ENCOUNTER
LAST VISIT 03/03/2023 WITH JEANETTE FRANCOIS CNP, NEXT VISIT NONE.   Sandra Patton MD  to [facility] [address] [facility] Staff    [facility]     8:59 AM  Note     Please make appointment 2/2 recurrent use of controlled meds and  need to do uds/ med agreement      07/12/2023

## 2023-08-05 DIAGNOSIS — M72.2 PLANTAR FASCIITIS OF RIGHT FOOT: ICD-10-CM

## 2023-08-05 DIAGNOSIS — S39.012A STRAIN OF LUMBAR REGION, INITIAL ENCOUNTER: ICD-10-CM

## 2023-08-07 RX ORDER — TRAMADOL HYDROCHLORIDE 50 MG/1
TABLET ORAL
Qty: 20 TABLET | OUTPATIENT
Start: 2023-08-07

## 2023-08-07 RX ORDER — METHYLPREDNISOLONE 4 MG/1
TABLET ORAL
Qty: 21 TABLET | OUTPATIENT
Start: 2023-08-07

## 2023-08-09 ENCOUNTER — OFFICE VISIT (OUTPATIENT)
Dept: FAMILY MEDICINE CLINIC | Age: 43
End: 2023-08-09
Payer: COMMERCIAL

## 2023-08-09 VITALS
OXYGEN SATURATION: 100 % | DIASTOLIC BLOOD PRESSURE: 78 MMHG | BODY MASS INDEX: 24.91 KG/M2 | SYSTOLIC BLOOD PRESSURE: 120 MMHG | HEIGHT: 70 IN | HEART RATE: 84 BPM | WEIGHT: 174 LBS

## 2023-08-09 DIAGNOSIS — Z11.59 NEED FOR HEPATITIS C SCREENING TEST: ICD-10-CM

## 2023-08-09 DIAGNOSIS — M67.471 GANGLION CYST OF RIGHT FOOT: Primary | ICD-10-CM

## 2023-08-09 PROCEDURE — 99202 OFFICE O/P NEW SF 15 MIN: CPT | Performed by: NURSE PRACTITIONER

## 2023-08-09 PROCEDURE — G8427 DOCREV CUR MEDS BY ELIG CLIN: HCPCS | Performed by: NURSE PRACTITIONER

## 2023-08-09 PROCEDURE — G8420 CALC BMI NORM PARAMETERS: HCPCS | Performed by: NURSE PRACTITIONER

## 2023-08-09 PROCEDURE — 1036F TOBACCO NON-USER: CPT | Performed by: NURSE PRACTITIONER

## 2023-08-09 RX ORDER — CEFUROXIME AXETIL 250 MG/1
TABLET ORAL
COMMUNITY
Start: 2023-07-16

## 2023-08-09 NOTE — PROGRESS NOTES
Jorgito Kline (:  1980) is a 37 y.o. male,Established patient, here for evaluation of the following chief complaint(s): Foot Swelling (2 weeks)      SUBJECTIVE/OBJECTIVE:  HPI  NOAM C/O  RIGHT FOOT PAIN  ON TOP OF HIS FOOT FOR THE LAST 5-6 MONTHS THE LUMP IS TENDER TO TOUCH-  HIS TOES FEEL WEIRD  - NO NUMBNESS NO INJURY NOTED  Review of Systems   Musculoskeletal:         KNOT ON RIGHT FOOT     Physical Exam  Vitals reviewed. Constitutional:       General: He is awake. He is not in acute distress. Appearance: Normal appearance. He is well-developed, well-groomed and normal weight. He is not ill-appearing, toxic-appearing or diaphoretic. Musculoskeletal:        Feet:    Feet:      Comments: RIGHT FOOT /DORSAL ASPECT MOBILE SOFT NON FLUCTUANT WELL CIRCUMSCRIBED MASS ABOUT THE SIZE OF A  FIFTY CENT PIECE PALPATED  Neurological:      Mental Status: He is alert and oriented to person, place, and time. Psychiatric:         Attention and Perception: Attention and perception normal.         Mood and Affect: Mood and affect normal.         Speech: Speech normal.         Behavior: Behavior normal. Behavior is cooperative. Thought Content: Thought content normal.         Cognition and Memory: Cognition and memory normal.         Judgment: Judgment normal.       Josh Hicks was seen today for foot swelling. Diagnoses and all orders for this visit:    Ganglion cyst of right foot  [facility]  [facility] - Lorri Sanchez DPM, Podiatry, [address]               An electronic signature was used to authenticate this note.     --Hailee Meza, APRN - CNP

## 2023-08-09 NOTE — PATIENT INSTRUCTIONS
You may receive a survey regarding the care you received during your visit. Your input is valuable to us. We encourage you to complete and return your survey. We hope you will choose us in the future for your healthcare needs. GENERAL OFFICE POLICIES      Telephone Calls: Messages will be answered within 1-2 business days, unless the provider is out of the office. If it is urgent a covering provider will answer. (this does not include Medication refills). MyChart: We recommend all patients sign up for [employer]. Through this portal you can see your lab results, request refills, schedule appointments, pay your bill and send messages to the office. Posehart messages will be answered within 1-2 business days unless the provider is out of the office. For urgent matters, please call the office. Appointments:  All appointments must be scheduled. We ask all patients to schedule their next follow up appointment before they leave the office to make sure you will be able to be seen before you run out of medications. 24 hours notice is required to cancel or reschedule an appointment to avoid being marked as a no show. You may be dismissed from the practice after 3 no shows. LATE for Appointment: If you are 15 or more minutes late for your appointment, you may be asked to reschedule. MA/LAB APPTS: Must be scheduled, cannot accept walk in lab visits. We only draw labs for patients established in our office. We only do injections for medications ordered by our office. Acute Sick Visits:  Nothing other than acute complaint will be addressed at this visit. TRADITIONAL MEDICARE  DOES NOT COVER PHYSICALS  MEDICARE WELLNESS VISITS: These are NOT physicals but the free annual visit offered by Medicare to discuss wellness issues. Medication refills, checkups, etc. will not be addressed during this visit.   Medication Refills: Refills are handled electronically so please contact your pharmacy for medication refills

## 2024-10-18 ENCOUNTER — OFFICE VISIT (OUTPATIENT)
Dept: FAMILY MEDICINE CLINIC | Age: 44
End: 2024-10-18
Payer: COMMERCIAL

## 2024-10-18 VITALS
OXYGEN SATURATION: 94 % | HEIGHT: 70 IN | BODY MASS INDEX: 26.34 KG/M2 | HEART RATE: 77 BPM | WEIGHT: 184 LBS | DIASTOLIC BLOOD PRESSURE: 68 MMHG | SYSTOLIC BLOOD PRESSURE: 108 MMHG

## 2024-10-18 DIAGNOSIS — H65.191 OTHER NON-RECURRENT ACUTE NONSUPPURATIVE OTITIS MEDIA OF RIGHT EAR: Primary | ICD-10-CM

## 2024-10-18 PROCEDURE — 99213 OFFICE O/P EST LOW 20 MIN: CPT | Performed by: REGISTERED NURSE

## 2024-10-18 RX ORDER — OFLOXACIN 3 MG/ML
5 SOLUTION AURICULAR (OTIC) 2 TIMES DAILY
Qty: 5 ML | Refills: 0 | Status: SHIPPED | OUTPATIENT
Start: 2024-10-18 | End: 2024-10-18

## 2024-10-18 SDOH — ECONOMIC STABILITY: FOOD INSECURITY: WITHIN THE PAST 12 MONTHS, YOU WORRIED THAT YOUR FOOD WOULD RUN OUT BEFORE YOU GOT MONEY TO BUY MORE.: NEVER TRUE

## 2024-10-18 SDOH — ECONOMIC STABILITY: FOOD INSECURITY: WITHIN THE PAST 12 MONTHS, THE FOOD YOU BOUGHT JUST DIDN'T LAST AND YOU DIDN'T HAVE MONEY TO GET MORE.: NEVER TRUE

## 2024-10-18 SDOH — ECONOMIC STABILITY: INCOME INSECURITY: IN THE LAST 12 MONTHS, WAS THERE A TIME WHEN YOU WERE NOT ABLE TO PAY THE MORTGAGE OR RENT ON TIME?: NO

## 2024-10-18 SDOH — ECONOMIC STABILITY: INCOME INSECURITY: HOW HARD IS IT FOR YOU TO PAY FOR THE VERY BASICS LIKE FOOD, HOUSING, MEDICAL CARE, AND HEATING?: NOT HARD AT ALL

## 2024-10-18 ASSESSMENT — ENCOUNTER SYMPTOMS
VOICE CHANGE: 0
SINUS PAIN: 0
SINUS PRESSURE: 0
RHINORRHEA: 0
SORE THROAT: 0

## 2024-10-18 ASSESSMENT — PATIENT HEALTH QUESTIONNAIRE - PHQ9
2. FEELING DOWN, DEPRESSED OR HOPELESS: NOT AT ALL
SUM OF ALL RESPONSES TO PHQ QUESTIONS 1-9: 0
SUM OF ALL RESPONSES TO PHQ QUESTIONS 1-9: 0
SUM OF ALL RESPONSES TO PHQ9 QUESTIONS 1 & 2: 0
SUM OF ALL RESPONSES TO PHQ QUESTIONS 1-9: 0
SUM OF ALL RESPONSES TO PHQ QUESTIONS 1-9: 0
1. LITTLE INTEREST OR PLEASURE IN DOING THINGS: NOT AT ALL

## 2024-10-18 NOTE — PROGRESS NOTES
oropharyngeal erythema.   Eyes:      Extraocular Movements: Extraocular movements intact.      Conjunctiva/sclera: Conjunctivae normal.      Pupils: Pupils are equal, round, and reactive to light.   Cardiovascular:      Rate and Rhythm: Normal rate.   Pulmonary:      Effort: Pulmonary effort is normal.   Musculoskeletal:      Cervical back: Normal range of motion and neck supple.   Neurological:      Mental Status: He is alert.                  An electronic signature was used to authenticate this note.    --LES Hong - CNP

## 2025-06-13 ENCOUNTER — PROCEDURE VISIT (OUTPATIENT)
Dept: FAMILY MEDICINE CLINIC | Age: 45
End: 2025-06-13

## 2025-06-13 VITALS
OXYGEN SATURATION: 98 % | BODY MASS INDEX: 25.48 KG/M2 | DIASTOLIC BLOOD PRESSURE: 86 MMHG | WEIGHT: 178 LBS | HEIGHT: 70 IN | HEART RATE: 64 BPM | SYSTOLIC BLOOD PRESSURE: 124 MMHG

## 2025-06-13 DIAGNOSIS — L73.9 HAIR FOLLICLE INFECTION: Primary | ICD-10-CM

## 2025-06-13 RX ORDER — DOXYCYCLINE HYCLATE 100 MG
100 TABLET ORAL 2 TIMES DAILY
Qty: 20 TABLET | Refills: 0 | Status: SHIPPED | OUTPATIENT
Start: 2025-06-13 | End: 2025-06-23

## 2025-06-13 SDOH — ECONOMIC STABILITY: FOOD INSECURITY: WITHIN THE PAST 12 MONTHS, YOU WORRIED THAT YOUR FOOD WOULD RUN OUT BEFORE YOU GOT MONEY TO BUY MORE.: NEVER TRUE

## 2025-06-13 SDOH — ECONOMIC STABILITY: FOOD INSECURITY: WITHIN THE PAST 12 MONTHS, THE FOOD YOU BOUGHT JUST DIDN'T LAST AND YOU DIDN'T HAVE MONEY TO GET MORE.: NEVER TRUE

## 2025-06-13 ASSESSMENT — PATIENT HEALTH QUESTIONNAIRE - PHQ9
SUM OF ALL RESPONSES TO PHQ QUESTIONS 1-9: 0
2. FEELING DOWN, DEPRESSED OR HOPELESS: NOT AT ALL
1. LITTLE INTEREST OR PLEASURE IN DOING THINGS: NOT AT ALL

## 2025-06-13 NOTE — PROGRESS NOTES
Chief Complaint   Patient presents with    Cyst     Pt C/O Poss ingrown hair on chin, painful knot x 3-4 years- getting worse x 3-4 weeks          ASSESSMENT/PLAN    Problem List           Diagnosed       Musculoskeletal and Integument    Hair follicle infection - Primary      New, not at goal (unstable), In-office I&D performed with iodoform placement. Start 10-day course Doxycycline 100 mg BID due to the concern of pus drainage that can be from MRSA infection. Wound culture obtained. Advised shaving beard to prevent from secondary infection.         Relevant Medications    doxycycline hyclate (VIBRA-TABS) 100 MG tablet    Other Relevant Orders    Incision & Drainage (Completed)    Culture, Wound (with Gram Stain)         Patient was recommended to follow up with annual well health exam.    Return in about 1 week (around 2025) for Wound check.    Subjective   Honorio Cuevas is a 44 y.o. male being seen in clinic for facial lesion. Onset of symptom: initially 3-4 months but noticed increased size, pain, and foul-smelling drainage since about 3 weeks ago. Tried OTC topical antibiotics without relief. Location: Chin. Does have rosario. Does report prior history of MRSA infection on the face.      Review of Systems  Per HPI    Social History     Tobacco Use    Smoking status: Former     Current packs/day: 0.00     Average packs/day: 1 pack/day for 15.0 years (15.0 ttl pk-yrs)     Types: Cigarettes     Start date: 10/28/2000     Quit date: 10/28/2015     Years since quittin.6    Smokeless tobacco: Never   Vaping Use    Vaping status: Never Used   Substance Use Topics    Alcohol use: No     Comment: SOCIALLY- VERY RARE     Drug use: Never       Patient Active Problem List   Diagnosis    Bilateral inguinal hernia without obstruction or gangrene    Umbilical hernia without obstruction and without gangrene    Cervical nerve root disorder    Cervical spinal stenosis    LUQ pain    Cervical radiculopathy    Contusion

## 2025-06-15 LAB
BACTERIA SPEC AEROBE CULT: ABNORMAL
BACTERIA SPEC AEROBE CULT: ABNORMAL
GRAM STN SPEC: ABNORMAL
ORGANISM: ABNORMAL
ORGANISM: ABNORMAL

## 2025-06-17 ENCOUNTER — RESULTS FOLLOW-UP (OUTPATIENT)
Dept: FAMILY MEDICINE CLINIC | Age: 45
End: 2025-06-17

## 2025-06-20 ENCOUNTER — OFFICE VISIT (OUTPATIENT)
Dept: FAMILY MEDICINE CLINIC | Age: 45
End: 2025-06-20

## 2025-06-20 VITALS
WEIGHT: 183.4 LBS | DIASTOLIC BLOOD PRESSURE: 74 MMHG | OXYGEN SATURATION: 98 % | SYSTOLIC BLOOD PRESSURE: 118 MMHG | HEART RATE: 65 BPM | BODY MASS INDEX: 26.26 KG/M2 | HEIGHT: 70 IN

## 2025-06-20 DIAGNOSIS — L73.9 HAIR FOLLICLE INFECTION: Primary | ICD-10-CM

## 2025-06-20 PROCEDURE — 99024 POSTOP FOLLOW-UP VISIT: CPT

## 2025-06-20 NOTE — PROGRESS NOTES
Chief Complaint   Patient presents with    Wound Check     1 week follow up for Wound check- getting better but still feels like there is bump          ASSESSMENT/PLAN    Problem List           Diagnosed       Musculoskeletal and Integument    Hair follicle infection - Primary      New, at goal (stable), Well-healed after I&D. Reassuring overall.         Relevant Medications    doxycycline hyclate (VIBRA-TABS) 100 MG tablet         Patient was recommended to follow up with annual well health exam.    Return if symptoms worsen or fail to improve.    Subjective   Honorio Cuevas is a 44 y.o. male being seen in clinic for wound check. S/p I&D of infected hair follicle on the chin 1 week ago. Patient does feel a small knot within the lesion but reported significant improvement of pain and swelling since the procedure.    Review of Systems  Per HPI    Social History     Tobacco Use    Smoking status: Former     Current packs/day: 0.00     Average packs/day: 1 pack/day for 15.0 years (15.0 ttl pk-yrs)     Types: Cigarettes     Start date: 10/28/2000     Quit date: 10/28/2015     Years since quittin.6    Smokeless tobacco: Never   Vaping Use    Vaping status: Never Used   Substance Use Topics    Alcohol use: No     Comment: SOCIALLY- VERY RARE     Drug use: Never       Patient Active Problem List   Diagnosis    Bilateral inguinal hernia without obstruction or gangrene    Umbilical hernia without obstruction and without gangrene    Cervical nerve root disorder    Cervical spinal stenosis    LUQ pain    Cervical radiculopathy    Contusion of left foot    Episodic cluster headache, not intractable    DDD (degenerative disc disease), cervical    Plantar fasciitis of right foot    Hair follicle infection        Objective   Vitals:  /74 (BP Site: Left Upper Arm, Patient Position: Sitting, BP Cuff Size: Medium Adult)   Pulse 65   Ht 1.778 m (5' 10\")   Wt 83.2 kg (183 lb 6.4 oz) Comment: with shoes  SpO2 98%   BMI

## 2025-06-26 NOTE — ASSESSMENT & PLAN NOTE
New, not at goal (unstable), In-office I&D performed with iodoform placement. Start 10-day course Doxycycline 100 mg BID due to the concern of pus drainage that can be from MRSA infection. Wound culture obtained. Advised shaving beard to prevent from secondary infection.   Reason for call: Medical clearance request   Surgery date: 08/18    Notes: Surgery scheduler called to PCP office to schedule pre-op exam. PSR explained there is not openings in the schedule prior to surgery and will have to place a message for a call back. Writer left call back number [phone].     Provider call back number: [phone]  Fax number:  n/a   Provider: Dr. Hugo Washington    Company/Department: [facility]

## 2025-07-22 NOTE — TELEPHONE ENCOUNTER
From: Ivan Ernstll  To: Dr. Sanford Fan: 2/24/2023 9:20 AM EST  Subject: Refill     Is it possible to get a refill for the pain medicine for my foot no

## (undated) DEVICE — SOLUTION,SALINE,IRRGATION,500ML,STRL: Brand: MEDLINE

## (undated) DEVICE — BLADE 1882940HR 5PK M4 INF TURB 2.9MM: Brand: STRAIGHTSHOT

## (undated) DEVICE — SUTURE 4-0 L18IN ABSRB BRN PS-4 L16MM 1/2 CIR PRIM REV CUT 1643G

## (undated) DEVICE — AGENT HEMSTAT W2XL3IN OXIDIZED REGENERATED CELOS ABSRB

## (undated) DEVICE — GLOVE ORANGE PI 7 1/2   MSG9075

## (undated) DEVICE — TOWEL,OR,DSP,ST,BLUE,STD,4/PK,20PK/CS: Brand: MEDLINE

## (undated) DEVICE — SYRINGE MED 30ML STD CLR PLAS LUERLOCK TIP N CTRL DISP

## (undated) DEVICE — CONTAINER,SPECIMEN,PNEU TUBE,3OZ,OR STRL: Brand: MEDLINE

## (undated) DEVICE — KIT,ANTI FOG,W/SPONGE & FLUID,SOFT PACK: Brand: MEDLINE

## (undated) DEVICE — Device

## (undated) DEVICE — SYRINGE MED 10ML LUERLOCK TIP W/O SFTY DISP

## (undated) DEVICE — SUTURE ABSORBABLE MONOFILAMENT 4-0 SC-1 18 IN PLN GUT 1824H

## (undated) DEVICE — GOWN SIRUS NONREIN XL W/TWL: Brand: MEDLINE INDUSTRIES, INC.

## (undated) DEVICE — ENT I-LF: Brand: MEDLINE INDUSTRIES, INC.

## (undated) DEVICE — TUBING, SUCTION, 1/4" X 12', STRAIGHT: Brand: MEDLINE

## (undated) DEVICE — SPONGE,NEURO,1"X3",XR,STRL,LF,10/PK: Brand: MEDLINE

## (undated) DEVICE — TURBINATOR WAND: Brand: COBLATION

## (undated) DEVICE — SUTURE PROL SZ 3-0 L18IN NONABSORBABLE BLU L30MM FS-1 3/8 8663G

## (undated) DEVICE — INTENDED FOR TISSUE SEPARATION, AND OTHER PROCEDURES THAT REQUIRE A SHARP SURGICAL BLADE TO PUNCTURE OR CUT.: Brand: BARD-PARKER ® STAINLESS STEEL BLADES